# Patient Record
Sex: FEMALE | Race: WHITE | Employment: UNEMPLOYED | URBAN - METROPOLITAN AREA
[De-identification: names, ages, dates, MRNs, and addresses within clinical notes are randomized per-mention and may not be internally consistent; named-entity substitution may affect disease eponyms.]

---

## 2018-01-30 PROBLEM — G44.209 TENSION HEADACHE: Status: ACTIVE | Noted: 2018-01-30

## 2018-01-30 PROBLEM — E03.9 HYPOTHYROIDISM: Status: ACTIVE | Noted: 2018-01-30

## 2018-01-30 PROBLEM — R91.1 PULMONARY NODULE: Status: ACTIVE | Noted: 2018-01-30

## 2018-04-10 ENCOUNTER — HOSPITAL ENCOUNTER (EMERGENCY)
Age: 28
Discharge: HOME | End: 2018-04-10
Attending: EMERGENCY MEDICINE
Payer: OTHER GOVERNMENT

## 2018-04-10 VITALS
TEMPERATURE: 98.2 F | HEIGHT: 70 IN | OXYGEN SATURATION: 100 % | DIASTOLIC BLOOD PRESSURE: 76 MMHG | WEIGHT: 200 LBS | BODY MASS INDEX: 28.63 KG/M2 | HEART RATE: 67 BPM | SYSTOLIC BLOOD PRESSURE: 125 MMHG | RESPIRATION RATE: 18 BRPM

## 2018-04-10 DIAGNOSIS — G43.109 MIGRAINE WITH AURA AND WITHOUT STATUS MIGRAINOSUS, NOT INTRACTABLE: Primary | ICD-10-CM

## 2018-04-10 LAB
CHP ED QC CHECK: YES
PREGNANCY TEST URINE, POC: NEGATIVE

## 2018-04-10 PROCEDURE — 96375 TX/PRO/DX INJ NEW DRUG ADDON: CPT

## 2018-04-10 PROCEDURE — 99284 EMERGENCY DEPT VISIT MOD MDM: CPT

## 2018-04-10 PROCEDURE — 6360000002 HC RX W HCPCS: Performed by: FAMILY MEDICINE

## 2018-04-10 PROCEDURE — 96374 THER/PROPH/DIAG INJ IV PUSH: CPT

## 2018-04-10 RX ORDER — DIPHENHYDRAMINE HYDROCHLORIDE 50 MG/ML
25 INJECTION INTRAMUSCULAR; INTRAVENOUS ONCE
Status: COMPLETED | OUTPATIENT
Start: 2018-04-10 | End: 2018-04-10

## 2018-04-10 RX ORDER — KETOROLAC TROMETHAMINE 30 MG/ML
15 INJECTION, SOLUTION INTRAMUSCULAR; INTRAVENOUS ONCE
Status: DISCONTINUED | OUTPATIENT
Start: 2018-04-10 | End: 2018-04-10

## 2018-04-10 RX ORDER — KETOROLAC TROMETHAMINE 30 MG/ML
15 INJECTION, SOLUTION INTRAMUSCULAR; INTRAVENOUS ONCE
Status: COMPLETED | OUTPATIENT
Start: 2018-04-10 | End: 2018-04-10

## 2018-04-10 RX ADMIN — PROCHLORPERAZINE EDISYLATE 10 MG: 5 INJECTION INTRAMUSCULAR; INTRAVENOUS at 15:54

## 2018-04-10 RX ADMIN — DIPHENHYDRAMINE HYDROCHLORIDE 25 MG: 50 INJECTION, SOLUTION INTRAMUSCULAR; INTRAVENOUS at 15:51

## 2018-04-10 RX ADMIN — KETOROLAC TROMETHAMINE 15 MG: 30 INJECTION, SOLUTION INTRAMUSCULAR at 15:53

## 2018-04-10 ASSESSMENT — ENCOUNTER SYMPTOMS
VOMITING: 0
NAUSEA: 0
SHORTNESS OF BREATH: 0
BACK PAIN: 0
ABDOMINAL DISTENTION: 0
EYE PAIN: 0
EYE DISCHARGE: 0
SORE THROAT: 0
COUGH: 0
WHEEZING: 0
DIARRHEA: 0
EYE REDNESS: 0
SINUS PRESSURE: 0

## 2018-04-10 ASSESSMENT — PAIN DESCRIPTION - PAIN TYPE: TYPE: ACUTE PAIN

## 2018-04-10 ASSESSMENT — PAIN DESCRIPTION - FREQUENCY: FREQUENCY: CONTINUOUS

## 2018-04-10 ASSESSMENT — PAIN DESCRIPTION - ORIENTATION: ORIENTATION: RIGHT;LEFT;POSTERIOR

## 2018-04-10 ASSESSMENT — PAIN SCALES - GENERAL
PAINLEVEL_OUTOF10: 10
PAINLEVEL_OUTOF10: 10

## 2018-04-10 ASSESSMENT — PAIN DESCRIPTION - LOCATION: LOCATION: HEAD

## 2018-04-10 ASSESSMENT — PAIN DESCRIPTION - DESCRIPTORS: DESCRIPTORS: SHARP

## 2018-04-18 ENCOUNTER — TELEPHONE (OUTPATIENT)
Dept: NEUROLOGY | Age: 28
End: 2018-04-18

## 2018-05-20 ENCOUNTER — APPOINTMENT (OUTPATIENT)
Dept: CT IMAGING | Age: 28
End: 2018-05-20
Payer: OTHER GOVERNMENT

## 2018-05-20 ENCOUNTER — HOSPITAL ENCOUNTER (EMERGENCY)
Age: 28
Discharge: HOME | End: 2018-05-20
Attending: EMERGENCY MEDICINE
Payer: OTHER GOVERNMENT

## 2018-05-20 VITALS
DIASTOLIC BLOOD PRESSURE: 64 MMHG | BODY MASS INDEX: 28.92 KG/M2 | WEIGHT: 202 LBS | RESPIRATION RATE: 16 BRPM | HEIGHT: 70 IN | HEART RATE: 67 BPM | OXYGEN SATURATION: 99 % | SYSTOLIC BLOOD PRESSURE: 137 MMHG

## 2018-05-20 DIAGNOSIS — N83.201 CYST OF RIGHT OVARY: Primary | ICD-10-CM

## 2018-05-20 LAB
ALBUMIN SERPL-MCNC: 4.7 G/DL (ref 3.5–5.2)
ALP BLD-CCNC: 97 U/L (ref 35–104)
ALT SERPL-CCNC: 17 U/L (ref 0–32)
ANION GAP SERPL CALCULATED.3IONS-SCNC: 14 MMOL/L (ref 7–16)
AST SERPL-CCNC: 18 U/L (ref 0–31)
BACTERIA: ABNORMAL /HPF
BASOPHILS ABSOLUTE: 0.06 E9/L (ref 0–0.2)
BASOPHILS RELATIVE PERCENT: 0.5 % (ref 0–2)
BILIRUB SERPL-MCNC: 0.3 MG/DL (ref 0–1.2)
BILIRUBIN URINE: NEGATIVE
BLOOD, URINE: ABNORMAL
BUN BLDV-MCNC: 12 MG/DL (ref 6–20)
CALCIUM SERPL-MCNC: 9.4 MG/DL (ref 8.6–10.2)
CHLORIDE BLD-SCNC: 98 MMOL/L (ref 98–107)
CHP ED QC CHECK: NORMAL
CLARITY: ABNORMAL
CO2: 26 MMOL/L (ref 22–29)
COLOR: YELLOW
CREAT SERPL-MCNC: 0.7 MG/DL (ref 0.5–1)
EOSINOPHILS ABSOLUTE: 0.45 E9/L (ref 0.05–0.5)
EOSINOPHILS RELATIVE PERCENT: 3.7 % (ref 0–6)
EPITHELIAL CELLS, UA: ABNORMAL /HPF
GFR AFRICAN AMERICAN: >60
GFR NON-AFRICAN AMERICAN: >60 ML/MIN/1.73
GLUCOSE BLD-MCNC: 82 MG/DL (ref 74–109)
GLUCOSE URINE: NEGATIVE MG/DL
HCT VFR BLD CALC: 41.3 % (ref 34–48)
HEMOGLOBIN: 13.7 G/DL (ref 11.5–15.5)
IMMATURE GRANULOCYTES #: 0.03 E9/L
IMMATURE GRANULOCYTES %: 0.2 % (ref 0–5)
KETONES, URINE: NEGATIVE MG/DL
LEUKOCYTE ESTERASE, URINE: ABNORMAL
LIPASE: 23 U/L (ref 13–60)
LYMPHOCYTES ABSOLUTE: 2.74 E9/L (ref 1.5–4)
LYMPHOCYTES RELATIVE PERCENT: 22.3 % (ref 20–42)
MCH RBC QN AUTO: 28 PG (ref 26–35)
MCHC RBC AUTO-ENTMCNC: 33.2 % (ref 32–34.5)
MCV RBC AUTO: 84.5 FL (ref 80–99.9)
MONOCYTES ABSOLUTE: 0.74 E9/L (ref 0.1–0.95)
MONOCYTES RELATIVE PERCENT: 6 % (ref 2–12)
MUCUS: PRESENT
NEUTROPHILS ABSOLUTE: 8.27 E9/L (ref 1.8–7.3)
NEUTROPHILS RELATIVE PERCENT: 67.3 % (ref 43–80)
NITRITE, URINE: NEGATIVE
PDW BLD-RTO: 13.3 FL (ref 11.5–15)
PH UA: 6 (ref 5–9)
PLATELET # BLD: 300 E9/L (ref 130–450)
PMV BLD AUTO: 9.7 FL (ref 7–12)
POTASSIUM SERPL-SCNC: 3.7 MMOL/L (ref 3.5–5)
PREGNANCY TEST URINE, POC: NEGATIVE
PROTEIN UA: NEGATIVE MG/DL
RBC # BLD: 4.89 E12/L (ref 3.5–5.5)
RBC UA: ABNORMAL /HPF (ref 0–2)
SODIUM BLD-SCNC: 138 MMOL/L (ref 132–146)
SPECIFIC GRAVITY UA: >=1.03 (ref 1–1.03)
TOTAL PROTEIN: 7.4 G/DL (ref 6.4–8.3)
UROBILINOGEN, URINE: 0.2 E.U./DL
WBC # BLD: 12.3 E9/L (ref 4.5–11.5)
WBC UA: ABNORMAL /HPF (ref 0–5)

## 2018-05-20 PROCEDURE — 99284 EMERGENCY DEPT VISIT MOD MDM: CPT

## 2018-05-20 PROCEDURE — 74177 CT ABD & PELVIS W/CONTRAST: CPT

## 2018-05-20 PROCEDURE — 80053 COMPREHEN METABOLIC PANEL: CPT

## 2018-05-20 PROCEDURE — 85025 COMPLETE CBC W/AUTO DIFF WBC: CPT

## 2018-05-20 PROCEDURE — 6360000004 HC RX CONTRAST MEDICATION: Performed by: RADIOLOGY

## 2018-05-20 PROCEDURE — 96375 TX/PRO/DX INJ NEW DRUG ADDON: CPT

## 2018-05-20 PROCEDURE — 81001 URINALYSIS AUTO W/SCOPE: CPT

## 2018-05-20 PROCEDURE — 83690 ASSAY OF LIPASE: CPT

## 2018-05-20 PROCEDURE — 96374 THER/PROPH/DIAG INJ IV PUSH: CPT

## 2018-05-20 PROCEDURE — 2580000003 HC RX 258: Performed by: EMERGENCY MEDICINE

## 2018-05-20 PROCEDURE — 6360000002 HC RX W HCPCS: Performed by: EMERGENCY MEDICINE

## 2018-05-20 RX ORDER — DICLOFENAC SODIUM 75 MG/1
75 TABLET, DELAYED RELEASE ORAL 2 TIMES DAILY
Qty: 20 TABLET | Refills: 0 | Status: SHIPPED | OUTPATIENT
Start: 2018-05-20 | End: 2018-11-13

## 2018-05-20 RX ORDER — KETOROLAC TROMETHAMINE 30 MG/ML
30 INJECTION, SOLUTION INTRAMUSCULAR; INTRAVENOUS ONCE
Status: COMPLETED | OUTPATIENT
Start: 2018-05-20 | End: 2018-05-20

## 2018-05-20 RX ORDER — ONDANSETRON 2 MG/ML
4 INJECTION INTRAMUSCULAR; INTRAVENOUS ONCE
Status: COMPLETED | OUTPATIENT
Start: 2018-05-20 | End: 2018-05-20

## 2018-05-20 RX ORDER — 0.9 % SODIUM CHLORIDE 0.9 %
1000 INTRAVENOUS SOLUTION INTRAVENOUS ONCE
Status: COMPLETED | OUTPATIENT
Start: 2018-05-20 | End: 2018-05-20

## 2018-05-20 RX ORDER — ONDANSETRON 2 MG/ML
4 INJECTION INTRAMUSCULAR; INTRAVENOUS ONCE
Status: DISCONTINUED | OUTPATIENT
Start: 2018-05-20 | End: 2018-05-20 | Stop reason: HOSPADM

## 2018-05-20 RX ORDER — KETOROLAC TROMETHAMINE 30 MG/ML
15 INJECTION, SOLUTION INTRAMUSCULAR; INTRAVENOUS ONCE
Status: DISCONTINUED | OUTPATIENT
Start: 2018-05-20 | End: 2018-05-20 | Stop reason: HOSPADM

## 2018-05-20 RX ADMIN — ONDANSETRON 4 MG: 2 INJECTION INTRAMUSCULAR; INTRAVENOUS at 18:28

## 2018-05-20 RX ADMIN — SODIUM CHLORIDE 1000 ML: 9 INJECTION, SOLUTION INTRAVENOUS at 18:28

## 2018-05-20 RX ADMIN — IOPAMIDOL 110 ML: 755 INJECTION, SOLUTION INTRAVENOUS at 19:08

## 2018-05-20 RX ADMIN — KETOROLAC TROMETHAMINE 30 MG: 30 INJECTION INTRAMUSCULAR; INTRAVENOUS at 18:28

## 2018-05-20 ASSESSMENT — PAIN DESCRIPTION - PAIN TYPE: TYPE: ACUTE PAIN

## 2018-05-20 ASSESSMENT — PAIN DESCRIPTION - ORIENTATION: ORIENTATION: RIGHT

## 2018-05-20 ASSESSMENT — PAIN DESCRIPTION - LOCATION: LOCATION: ABDOMEN

## 2018-05-20 ASSESSMENT — PAIN SCALES - GENERAL
PAINLEVEL_OUTOF10: 4
PAINLEVEL_OUTOF10: 9
PAINLEVEL_OUTOF10: 9

## 2018-05-20 ASSESSMENT — PAIN DESCRIPTION - DESCRIPTORS: DESCRIPTORS: ACHING;SHARP

## 2018-05-25 ENCOUNTER — OFFICE VISIT (OUTPATIENT)
Dept: FAMILY MEDICINE CLINIC | Age: 28
End: 2018-05-25
Payer: OTHER GOVERNMENT

## 2018-05-25 ENCOUNTER — HOSPITAL ENCOUNTER (OUTPATIENT)
Age: 28
Discharge: HOME | End: 2018-05-27
Payer: OTHER GOVERNMENT

## 2018-05-25 VITALS
WEIGHT: 201 LBS | HEART RATE: 62 BPM | DIASTOLIC BLOOD PRESSURE: 78 MMHG | OXYGEN SATURATION: 98 % | SYSTOLIC BLOOD PRESSURE: 112 MMHG | BODY MASS INDEX: 28.84 KG/M2

## 2018-05-25 DIAGNOSIS — E03.9 HYPOTHYROIDISM, UNSPECIFIED TYPE: ICD-10-CM

## 2018-05-25 DIAGNOSIS — E03.9 HYPOTHYROIDISM, UNSPECIFIED TYPE: Primary | ICD-10-CM

## 2018-05-25 DIAGNOSIS — G44.209 TENSION HEADACHE: ICD-10-CM

## 2018-05-25 DIAGNOSIS — N83.209 CYST OF OVARY, UNSPECIFIED LATERALITY: ICD-10-CM

## 2018-05-25 LAB — TSH SERPL DL<=0.05 MIU/L-ACNC: 10.67 UIU/ML (ref 0.27–4.2)

## 2018-05-25 PROCEDURE — 84443 ASSAY THYROID STIM HORMONE: CPT

## 2018-05-25 PROCEDURE — 99214 OFFICE O/P EST MOD 30 MIN: CPT | Performed by: FAMILY MEDICINE

## 2018-05-29 DIAGNOSIS — E03.9 HYPOTHYROIDISM, UNSPECIFIED TYPE: Primary | ICD-10-CM

## 2018-05-29 RX ORDER — LEVOTHYROXINE SODIUM 0.1 MG/1
100 TABLET ORAL DAILY
Qty: 30 TABLET | Refills: 3 | Status: SHIPPED | OUTPATIENT
Start: 2018-05-29 | End: 2018-09-17 | Stop reason: SDUPTHER

## 2018-09-18 ENCOUNTER — OFFICE VISIT (OUTPATIENT)
Dept: FAMILY MEDICINE CLINIC | Age: 28
End: 2018-09-18
Payer: OTHER GOVERNMENT

## 2018-09-18 ENCOUNTER — HOSPITAL ENCOUNTER (OUTPATIENT)
Age: 28
Discharge: HOME | End: 2018-09-20
Payer: OTHER GOVERNMENT

## 2018-09-18 VITALS
TEMPERATURE: 98 F | OXYGEN SATURATION: 97 % | BODY MASS INDEX: 27.35 KG/M2 | WEIGHT: 191 LBS | HEART RATE: 80 BPM | DIASTOLIC BLOOD PRESSURE: 68 MMHG | SYSTOLIC BLOOD PRESSURE: 124 MMHG | RESPIRATION RATE: 20 BRPM | HEIGHT: 70 IN

## 2018-09-18 DIAGNOSIS — R53.83 OTHER FATIGUE: Primary | ICD-10-CM

## 2018-09-18 DIAGNOSIS — E03.9 HYPOTHYROIDISM, UNSPECIFIED TYPE: ICD-10-CM

## 2018-09-18 PROCEDURE — 99213 OFFICE O/P EST LOW 20 MIN: CPT | Performed by: FAMILY MEDICINE

## 2018-09-18 PROCEDURE — 86703 HIV-1/HIV-2 1 RESULT ANTBDY: CPT

## 2018-09-18 PROCEDURE — 84443 ASSAY THYROID STIM HORMONE: CPT

## 2018-09-18 RX ORDER — LEVOTHYROXINE SODIUM 0.1 MG/1
100 TABLET ORAL DAILY
Qty: 30 TABLET | Refills: 3 | Status: SHIPPED | OUTPATIENT
Start: 2018-09-18 | End: 2018-09-19 | Stop reason: SDUPTHER

## 2018-09-18 NOTE — PROGRESS NOTES
FM Progress Note    Subjective: Batsheva Hartman is a 24y.o. year old female here for hypothyroidism. Health Maintenance Due   Topic Date Due    HIV screen  11/30/2007    Cervical cancer screen  11/30/2013    Flu vaccine (1) 09/01/2018       Taking LT4 100 mcg for hypothyroidism. Lab Results   Component Value Date    TSH 10.670 (H) 05/25/2018     Slight constipation. no weight change. no hair changes. no palpitations. No hair thinning. Some fatigue over the past wk. Declined flu shot    Had pap a couple months ago, nl        Past Medical History:   Diagnosis Date    Anxiety     Headache     Hypothyroidism      Past Surgical History:   Procedure Laterality Date    CHOLECYSTECTOMY  2012     Family History   Problem Relation Age of Onset    Arrhythmia Mother         SVT s/p ablation    Cancer Father         skin (2017) & lymphoma (2015)     Social History     Social History    Marital status:      Spouse name: N/A    Number of children: N/A    Years of education: N/A     Occupational History    Not on file.      Social History Main Topics    Smoking status: Former Smoker     Packs/day: 0.25     Years: 1.00     Types: Cigarettes    Smokeless tobacco: Never Used    Alcohol use No    Drug use: No    Sexual activity: Yes     Partners: Male     Birth control/ protection: Pill     Other Topics Concern    Not on file     Social History Narrative    No narrative on file       Review Of Systems (bold are positive, all else are negative)  Gen: No fevers, sweats, chills  No fatigue  No unintentional weight changes  Resp: No cough, shortness of breath, wheeze, chest congestion  CV: No chest pain, palpitation, edema  Endo: No polyuria, polydipsia, polyphagia  No temperature intolerance  Neuro: No headaches  No syncope/near syncope  No dizziness      Objective:  /68   Pulse 80   Temp 98 °F (36.7 °C) (Oral)   Resp 20   Ht 5' 10\" (1.778 m)   Wt 191 lb (86.6 kg)   LMP 07/29/2018 (Exact Date)   SpO2 97%   BMI 27.41 kg/m²   General appearance: NAD, alert and interacting appropriately  HEENT: NCAT, PERRLA, EOMI   Resp: CTAB, no WRC  CVS: RRR, no MRG  Abdomen: BS +, SNDNT  Extremities: No clubbing, cyanosis, or edema. Warm. Dry. I have reviewed this patient's previous records. I have reviewed this patient's TSH    I have reviewed this patient's medications. Assessment/Plan:    Marva Jenkins was seen today for hypothyroidism. Diagnoses and all orders for this visit:    Other fatigue  -     HIV Screen; Future  -     TSH; Future    Hypothyroidism, unspecified type  -     levothyroxine (SYNTHROID) 100 MCG tablet; Take 1 tablet by mouth Daily  -     TSH; Future    Other orders  -     Cancel: INFLUENZA, QUADV, 3 YRS AND OLDER, IM, PF, PREFILL SYR OR SDV, 0.5ML (FLUZONE QUADV, PF)        Patient Instructions   Look up Alberto Wilson for a Vet. rtc pending TSH results.        Electronically signed by Didier Barcenas MD on 9/18/2018 at 3:52 PM

## 2018-09-19 DIAGNOSIS — E03.9 HYPOTHYROIDISM, UNSPECIFIED TYPE: ICD-10-CM

## 2018-09-19 LAB
HIV-1 AND HIV-2 ANTIBODIES: NORMAL
TSH SERPL DL<=0.05 MIU/L-ACNC: 4.54 UIU/ML (ref 0.27–4.2)

## 2018-09-19 RX ORDER — LEVOTHYROXINE SODIUM 112 UG/1
100 TABLET ORAL DAILY
Qty: 30 TABLET | Refills: 2 | Status: SHIPPED | OUTPATIENT
Start: 2018-09-19 | End: 2018-10-09 | Stop reason: SDUPTHER

## 2018-10-04 DIAGNOSIS — B37.9 YEAST INFECTION: Primary | ICD-10-CM

## 2018-10-04 RX ORDER — FLUCONAZOLE 150 MG/1
150 TABLET ORAL ONCE
Qty: 1 TABLET | Refills: 1 | Status: SHIPPED | OUTPATIENT
Start: 2018-10-04 | End: 2018-10-04

## 2018-10-09 DIAGNOSIS — E03.9 HYPOTHYROIDISM, UNSPECIFIED TYPE: ICD-10-CM

## 2018-10-09 RX ORDER — LEVOTHYROXINE SODIUM 0.1 MG/1
100 TABLET ORAL DAILY
Qty: 30 TABLET | Refills: 2 | Status: SHIPPED | OUTPATIENT
Start: 2018-10-09 | End: 2019-08-15 | Stop reason: CLARIF

## 2018-11-08 ENCOUNTER — HOSPITAL ENCOUNTER (OUTPATIENT)
Age: 28
Discharge: HOME | End: 2018-11-08
Payer: OTHER GOVERNMENT

## 2018-11-08 LAB
ABO/RH: NORMAL
ANTIBODY SCREEN: NORMAL
HBA1C MFR BLD: 5.2 % (ref 4–5.6)
HCT VFR BLD CALC: 37.2 % (ref 34–48)
HEMOGLOBIN: 12.9 G/DL (ref 11.5–15.5)
MCH RBC QN AUTO: 29.7 PG (ref 26–35)
MCHC RBC AUTO-ENTMCNC: 34.7 % (ref 32–34.5)
MCV RBC AUTO: 85.5 FL (ref 80–99.9)
PDW BLD-RTO: 13.1 FL (ref 11.5–15)
PLATELET # BLD: 272 E9/L (ref 130–450)
PMV BLD AUTO: 9.7 FL (ref 7–12)
RBC # BLD: 4.35 E12/L (ref 3.5–5.5)
T3 TOTAL: 153.4 NG/DL (ref 80–200)
T4 TOTAL: 10.5 MCG/DL (ref 4.5–11.7)
TSH SERPL DL<=0.05 MIU/L-ACNC: 2.75 UIU/ML (ref 0.27–4.2)
WBC # BLD: 10.2 E9/L (ref 4.5–11.5)

## 2018-11-08 PROCEDURE — 84443 ASSAY THYROID STIM HORMONE: CPT

## 2018-11-08 PROCEDURE — 85027 COMPLETE CBC AUTOMATED: CPT

## 2018-11-08 PROCEDURE — 86901 BLOOD TYPING SEROLOGIC RH(D): CPT

## 2018-11-08 PROCEDURE — 83036 HEMOGLOBIN GLYCOSYLATED A1C: CPT

## 2018-11-08 PROCEDURE — 84436 ASSAY OF TOTAL THYROXINE: CPT

## 2018-11-08 PROCEDURE — 86703 HIV-1/HIV-2 1 RESULT ANTBDY: CPT

## 2018-11-08 PROCEDURE — 86787 VARICELLA-ZOSTER ANTIBODY: CPT

## 2018-11-08 PROCEDURE — 81220 CFTR GENE COM VARIANTS: CPT

## 2018-11-08 PROCEDURE — 86900 BLOOD TYPING SEROLOGIC ABO: CPT

## 2018-11-08 PROCEDURE — 86592 SYPHILIS TEST NON-TREP QUAL: CPT

## 2018-11-08 PROCEDURE — 86803 HEPATITIS C AB TEST: CPT

## 2018-11-08 PROCEDURE — 86762 RUBELLA ANTIBODY: CPT

## 2018-11-08 PROCEDURE — 86850 RBC ANTIBODY SCREEN: CPT

## 2018-11-08 PROCEDURE — 84480 ASSAY TRIIODOTHYRONINE (T3): CPT

## 2018-11-08 PROCEDURE — 87340 HEPATITIS B SURFACE AG IA: CPT

## 2018-11-08 PROCEDURE — 81401 MOPATH PROCEDURE LEVEL 2: CPT

## 2018-11-08 PROCEDURE — 36415 COLL VENOUS BLD VENIPUNCTURE: CPT

## 2018-11-09 LAB
HEPATITIS B SURFACE ANTIGEN INTERPRETATION: NORMAL
HEPATITIS C ANTIBODY INTERPRETATION: NORMAL
HIV-1 AND HIV-2 ANTIBODIES: NORMAL
RPR: NORMAL

## 2018-11-13 ENCOUNTER — APPOINTMENT (OUTPATIENT)
Dept: MRI IMAGING | Age: 28
End: 2018-11-13
Payer: OTHER GOVERNMENT

## 2018-11-13 ENCOUNTER — HOSPITAL ENCOUNTER (EMERGENCY)
Age: 28
Discharge: HOME | End: 2018-11-13
Attending: EMERGENCY MEDICINE
Payer: OTHER GOVERNMENT

## 2018-11-13 ENCOUNTER — APPOINTMENT (OUTPATIENT)
Dept: ULTRASOUND IMAGING | Age: 28
End: 2018-11-13
Payer: OTHER GOVERNMENT

## 2018-11-13 VITALS
DIASTOLIC BLOOD PRESSURE: 66 MMHG | HEIGHT: 70 IN | SYSTOLIC BLOOD PRESSURE: 124 MMHG | OXYGEN SATURATION: 99 % | BODY MASS INDEX: 27.2 KG/M2 | HEART RATE: 64 BPM | WEIGHT: 190 LBS | RESPIRATION RATE: 20 BRPM | TEMPERATURE: 98 F

## 2018-11-13 DIAGNOSIS — R10.31 ABDOMINAL PAIN, RIGHT LOWER QUADRANT: Primary | ICD-10-CM

## 2018-11-13 DIAGNOSIS — O23.40 URINARY TRACT INFECTION IN MOTHER DURING PREGNANCY, ANTEPARTUM: ICD-10-CM

## 2018-11-13 LAB
ALBUMIN SERPL-MCNC: 4.6 G/DL (ref 3.5–5.2)
ALP BLD-CCNC: 82 U/L (ref 35–104)
ALT SERPL-CCNC: 12 U/L (ref 0–32)
ANION GAP SERPL CALCULATED.3IONS-SCNC: 14 MMOL/L (ref 7–16)
AST SERPL-CCNC: 12 U/L (ref 0–31)
BACTERIA: ABNORMAL /HPF
BASOPHILS ABSOLUTE: 0.03 E9/L (ref 0–0.2)
BASOPHILS RELATIVE PERCENT: 0.3 % (ref 0–2)
BILIRUB SERPL-MCNC: 0.4 MG/DL (ref 0–1.2)
BILIRUBIN URINE: NEGATIVE
BLOOD, URINE: ABNORMAL
BUN BLDV-MCNC: 10 MG/DL (ref 6–20)
CALCIUM SERPL-MCNC: 9.5 MG/DL (ref 8.6–10.2)
CHLORIDE BLD-SCNC: 100 MMOL/L (ref 98–107)
CHP ED QC CHECK: NORMAL
CLARITY: CLEAR
CO2: 24 MMOL/L (ref 22–29)
COLOR: YELLOW
CREAT SERPL-MCNC: 0.6 MG/DL (ref 0.5–1)
EOSINOPHILS ABSOLUTE: 0.19 E9/L (ref 0.05–0.5)
EOSINOPHILS RELATIVE PERCENT: 1.9 % (ref 0–6)
EPITHELIAL CELLS, UA: ABNORMAL /HPF
GFR AFRICAN AMERICAN: >60
GFR NON-AFRICAN AMERICAN: >60 ML/MIN/1.73
GLUCOSE BLD-MCNC: 80 MG/DL (ref 74–99)
GLUCOSE URINE: NEGATIVE MG/DL
HCT VFR BLD CALC: 38.5 % (ref 34–48)
HEMOGLOBIN: 13.3 G/DL (ref 11.5–15.5)
IMMATURE GRANULOCYTES #: 0.05 E9/L
IMMATURE GRANULOCYTES %: 0.5 % (ref 0–5)
KETONES, URINE: NEGATIVE MG/DL
LACTIC ACID, SEPSIS: 0.7 MMOL/L (ref 0.5–1.9)
LEUKOCYTE ESTERASE, URINE: ABNORMAL
LIPASE: 23 U/L (ref 13–60)
LYMPHOCYTES ABSOLUTE: 2.36 E9/L (ref 1.5–4)
LYMPHOCYTES RELATIVE PERCENT: 23.8 % (ref 20–42)
MCH RBC QN AUTO: 29.6 PG (ref 26–35)
MCHC RBC AUTO-ENTMCNC: 34.5 % (ref 32–34.5)
MCV RBC AUTO: 85.7 FL (ref 80–99.9)
MONOCYTES ABSOLUTE: 0.5 E9/L (ref 0.1–0.95)
MONOCYTES RELATIVE PERCENT: 5 % (ref 2–12)
NEUTROPHILS ABSOLUTE: 6.78 E9/L (ref 1.8–7.3)
NEUTROPHILS RELATIVE PERCENT: 68.5 % (ref 43–80)
NITRITE, URINE: NEGATIVE
PDW BLD-RTO: 12.9 FL (ref 11.5–15)
PH UA: 6.5 (ref 5–9)
PLATELET # BLD: 264 E9/L (ref 130–450)
PMV BLD AUTO: 9.6 FL (ref 7–12)
POTASSIUM SERPL-SCNC: 3.6 MMOL/L (ref 3.5–5)
PREGNANCY TEST URINE, POC: POSITIVE
PROTEIN UA: NEGATIVE MG/DL
RBC # BLD: 4.49 E12/L (ref 3.5–5.5)
RBC UA: ABNORMAL /HPF (ref 0–2)
RUBELLA ANTIBODY IGG: NORMAL
SODIUM BLD-SCNC: 138 MMOL/L (ref 132–146)
SPECIFIC GRAVITY UA: 1.01 (ref 1–1.03)
TOTAL PROTEIN: 7.6 G/DL (ref 6.4–8.3)
UROBILINOGEN, URINE: 0.2 E.U./DL
VARICELLA-ZOSTER VIRUS AB, IGG: NORMAL
WBC # BLD: 9.9 E9/L (ref 4.5–11.5)
WBC UA: ABNORMAL /HPF (ref 0–5)

## 2018-11-13 PROCEDURE — 81001 URINALYSIS AUTO W/SCOPE: CPT

## 2018-11-13 PROCEDURE — 74181 MRI ABDOMEN W/O CONTRAST: CPT

## 2018-11-13 PROCEDURE — 76705 ECHO EXAM OF ABDOMEN: CPT

## 2018-11-13 PROCEDURE — 83690 ASSAY OF LIPASE: CPT

## 2018-11-13 PROCEDURE — 87088 URINE BACTERIA CULTURE: CPT

## 2018-11-13 PROCEDURE — 99284 EMERGENCY DEPT VISIT MOD MDM: CPT

## 2018-11-13 PROCEDURE — 83605 ASSAY OF LACTIC ACID: CPT

## 2018-11-13 PROCEDURE — 85025 COMPLETE CBC W/AUTO DIFF WBC: CPT

## 2018-11-13 PROCEDURE — 2580000003 HC RX 258: Performed by: EMERGENCY MEDICINE

## 2018-11-13 PROCEDURE — 80053 COMPREHEN METABOLIC PANEL: CPT

## 2018-11-13 RX ORDER — CEPHALEXIN 500 MG/1
500 CAPSULE ORAL 2 TIMES DAILY
Qty: 14 CAPSULE | Refills: 0 | Status: SHIPPED | OUTPATIENT
Start: 2018-11-13 | End: 2018-11-20

## 2018-11-13 RX ORDER — METOCLOPRAMIDE 10 MG/1
10 TABLET ORAL 3 TIMES DAILY PRN
Qty: 30 TABLET | Refills: 1 | Status: ON HOLD | OUTPATIENT
Start: 2018-11-13 | End: 2019-05-10

## 2018-11-13 RX ORDER — 0.9 % SODIUM CHLORIDE 0.9 %
1000 INTRAVENOUS SOLUTION INTRAVENOUS ONCE
Status: COMPLETED | OUTPATIENT
Start: 2018-11-13 | End: 2018-11-13

## 2018-11-13 RX ADMIN — SODIUM CHLORIDE 1000 ML: 9 INJECTION, SOLUTION INTRAVENOUS at 08:05

## 2018-11-13 ASSESSMENT — PAIN DESCRIPTION - PAIN TYPE: TYPE: ACUTE PAIN

## 2018-11-13 ASSESSMENT — PAIN SCALES - GENERAL: PAINLEVEL_OUTOF10: 7

## 2018-11-13 ASSESSMENT — PAIN DESCRIPTION - LOCATION: LOCATION: ABDOMEN

## 2018-11-13 NOTE — ED NOTES
Discharge instructions reviewed , including diagnosis, medications, follow up appointments, home care, and also when to call 911. All discharge instructions questions answered.          Pt left ED ambulatory         Fanta Arnold RN  11/13/18 5823

## 2018-11-13 NOTE — ED PROVIDER NOTES
appearing, non toxic in NAD  Head: Normocephalic and atraumatic  Eyes: PERRL, EOMI, conjunctiva normal, sclera non icteric  Mouth: Oropharynx clear  Neck: Supple  Respiratory: Lungs clear to auscultation bilaterally, no wheezes, rales, or rhonchi. Not in respiratory distress  Cardiovascular:  Regular rate. Regular rhythm. No murmurs, gallops, or rubs. 2+ distal pulses  Chest: No chest wall tenderness  GI:  Abdomen Soft, RLQ tenderness, Non distended. +BS. No rebound, guarding, or rigidity. No pulsatile masses. Musculoskeletal: Moves all extremities x 4. Warm and well perfused, no clubbing, cyanosis, or edema. Right lower back tenderness. Integument: skin warm and dry. No rashes. Neurologic: GCS 15, no focal deficits, symmetric strength 5/5 in the upper and lower extremities bilaterally  Psychiatric: Normal Affect    -------------------------------------------------- RESULTS -------------------------------------------------  I have personally reviewed all laboratory and imaging results for this patient. Results are listed below.      LABS:  Results for orders placed or performed during the hospital encounter of 11/13/18   Comprehensive Metabolic Panel   Result Value Ref Range    Sodium 138 132 - 146 mmol/L    Potassium 3.6 3.5 - 5.0 mmol/L    Chloride 100 98 - 107 mmol/L    CO2 24 22 - 29 mmol/L    Anion Gap 14 7 - 16 mmol/L    Glucose 80 74 - 99 mg/dL    BUN 10 6 - 20 mg/dL    CREATININE 0.6 0.5 - 1.0 mg/dL    GFR Non-African American >60 >=60 mL/min/1.73    GFR African American >60     Calcium 9.5 8.6 - 10.2 mg/dL    Total Protein 7.6 6.4 - 8.3 g/dL    Alb 4.6 3.5 - 5.2 g/dL    Total Bilirubin 0.4 0.0 - 1.2 mg/dL    Alkaline Phosphatase 82 35 - 104 U/L    ALT 12 0 - 32 U/L    AST 12 0 - 31 U/L   CBC Auto Differential   Result Value Ref Range    WBC 9.9 4.5 - 11.5 E9/L    RBC 4.49 3.50 - 5.50 E12/L    Hemoglobin 13.3 11.5 - 15.5 g/dL    Hematocrit 38.5 34.0 - 48.0 %    MCV 85.7 80.0 - 99.9 fL    MCH 29.6 26.0

## 2018-11-14 LAB
CYSTIC FIBROSIS 165 VARIANTS INTERP: NORMAL
CYSTIC FIBROSIS 5T VARIANT: NORMAL
CYSTIC FIBROSIS ALLELE 1: NEGATIVE
CYSTIC FIBROSIS ALLELE 2: NEGATIVE
URINE CULTURE, ROUTINE: NORMAL

## 2018-11-26 LAB
INTERPRETATION: NORMAL
SMA COPY NUMBER, LINKED VARIANT: NORMAL
SMA COPY NUMBER, SMN1 COPIES: NORMAL
SMA COPY NUMBER, SMN2 COPIES: NORMAL
SMA COPY NUMBER, SPECIMEN: NORMAL
SMA COPY NUMBER, SYMPTOMS: NORMAL

## 2018-12-04 ENCOUNTER — TELEPHONE (OUTPATIENT)
Dept: FAMILY MEDICINE CLINIC | Age: 28
End: 2018-12-04

## 2018-12-04 NOTE — TELEPHONE ENCOUNTER
Letter sent to patient to reschedule appointment that was on 12/18/18 due to provider unavailable. Left 3 voice messages for patient to contact office .

## 2019-01-13 ENCOUNTER — HOSPITAL ENCOUNTER (OUTPATIENT)
Age: 29
Discharge: HOME | End: 2019-01-14
Attending: SPECIALIST | Admitting: SPECIALIST
Payer: OTHER GOVERNMENT

## 2019-01-13 VITALS
TEMPERATURE: 99.5 F | RESPIRATION RATE: 16 BRPM | HEART RATE: 82 BPM | SYSTOLIC BLOOD PRESSURE: 122 MMHG | DIASTOLIC BLOOD PRESSURE: 60 MMHG

## 2019-01-13 PROBLEM — O26.892 PELVIC PRESSURE IN PREGNANCY, SECOND TRIMESTER: Status: ACTIVE | Noted: 2019-01-13

## 2019-01-13 PROBLEM — R10.2 PELVIC PRESSURE IN PREGNANCY, SECOND TRIMESTER: Status: ACTIVE | Noted: 2019-01-13

## 2019-01-13 LAB
BILIRUBIN URINE: NEGATIVE
BLOOD, URINE: ABNORMAL
CLARITY: CLEAR
COLOR: YELLOW
GLUCOSE URINE: NEGATIVE MG/DL
KETONES, URINE: NEGATIVE MG/DL
LEUKOCYTE ESTERASE, URINE: ABNORMAL
NITRITE, URINE: NEGATIVE
PH UA: 6 (ref 5–9)
PROTEIN UA: NEGATIVE MG/DL
SPECIFIC GRAVITY UA: 1.02 (ref 1–1.03)
UROBILINOGEN, URINE: 0.2 E.U./DL

## 2019-01-13 PROCEDURE — 99203 OFFICE O/P NEW LOW 30 MIN: CPT | Performed by: NURSE PRACTITIONER

## 2019-01-13 PROCEDURE — 81001 URINALYSIS AUTO W/SCOPE: CPT

## 2019-01-14 LAB
BACTERIA: ABNORMAL /HPF
CRYSTALS, UA: ABNORMAL
EPITHELIAL CELLS, UA: ABNORMAL /HPF
RBC UA: ABNORMAL /HPF (ref 0–2)
WBC UA: ABNORMAL /HPF (ref 0–5)

## 2019-01-14 PROCEDURE — 99211 OFF/OP EST MAY X REQ PHY/QHP: CPT

## 2019-01-14 PROCEDURE — 99201 HC NEW PT, OUTPT VISIT LEVEL 1: CPT

## 2019-01-22 ENCOUNTER — HOSPITAL ENCOUNTER (OUTPATIENT)
Age: 29
Discharge: HOME | End: 2019-01-22
Payer: OTHER GOVERNMENT

## 2019-01-22 LAB
T4 FREE: 1.01 NG/DL (ref 0.93–1.7)
TSH SERPL DL<=0.05 MIU/L-ACNC: 2.79 UIU/ML (ref 0.27–4.2)

## 2019-01-22 PROCEDURE — 36415 COLL VENOUS BLD VENIPUNCTURE: CPT

## 2019-01-22 PROCEDURE — 84443 ASSAY THYROID STIM HORMONE: CPT

## 2019-01-22 PROCEDURE — 84439 ASSAY OF FREE THYROXINE: CPT

## 2019-02-19 ENCOUNTER — HOSPITAL ENCOUNTER (OUTPATIENT)
Age: 29
Discharge: HOME | End: 2019-02-19
Payer: OTHER GOVERNMENT

## 2019-02-19 LAB
T3 FREE: 2.9 PG/ML (ref 2–4.4)
T4 FREE: 0.91 NG/DL (ref 0.93–1.7)
TSH SERPL DL<=0.05 MIU/L-ACNC: 4.49 UIU/ML (ref 0.27–4.2)

## 2019-02-19 PROCEDURE — 84443 ASSAY THYROID STIM HORMONE: CPT

## 2019-02-19 PROCEDURE — 36415 COLL VENOUS BLD VENIPUNCTURE: CPT

## 2019-02-19 PROCEDURE — 84439 ASSAY OF FREE THYROXINE: CPT

## 2019-02-19 PROCEDURE — 84481 FREE ASSAY (FT-3): CPT

## 2019-03-04 ENCOUNTER — HOSPITAL ENCOUNTER (OUTPATIENT)
Age: 29
Discharge: HOME | End: 2019-03-04
Payer: OTHER GOVERNMENT

## 2019-03-04 LAB
ABO/RH: NORMAL
ANTIBODY SCREEN: NORMAL

## 2019-03-04 PROCEDURE — 86901 BLOOD TYPING SEROLOGIC RH(D): CPT

## 2019-03-04 PROCEDURE — 86900 BLOOD TYPING SEROLOGIC ABO: CPT

## 2019-03-04 PROCEDURE — 86850 RBC ANTIBODY SCREEN: CPT

## 2019-03-04 PROCEDURE — 36415 COLL VENOUS BLD VENIPUNCTURE: CPT

## 2019-03-06 ENCOUNTER — HOSPITAL ENCOUNTER (OUTPATIENT)
Dept: INFUSION THERAPY | Age: 29
Setting detail: INFUSION SERIES
Discharge: HOME | End: 2019-03-06
Payer: OTHER GOVERNMENT

## 2019-03-06 VITALS
TEMPERATURE: 98.1 F | HEIGHT: 70 IN | HEART RATE: 109 BPM | SYSTOLIC BLOOD PRESSURE: 114 MMHG | DIASTOLIC BLOOD PRESSURE: 59 MMHG | BODY MASS INDEX: 29.35 KG/M2 | RESPIRATION RATE: 16 BRPM | OXYGEN SATURATION: 99 % | WEIGHT: 205 LBS

## 2019-03-06 LAB — RHIG LOT NUMBER: NORMAL

## 2019-03-06 PROCEDURE — 6360000002 HC RX W HCPCS: Performed by: PHYSICIAN ASSISTANT

## 2019-03-06 PROCEDURE — 96372 THER/PROPH/DIAG INJ SC/IM: CPT

## 2019-03-06 RX ADMIN — HUMAN RHO(D) IMMUNE GLOBULIN 300 MCG: 300 INJECTION, SOLUTION INTRAMUSCULAR at 10:28

## 2019-03-20 ENCOUNTER — HOSPITAL ENCOUNTER (OUTPATIENT)
Age: 29
Setting detail: OBSERVATION
Discharge: HOME | End: 2019-03-20
Attending: SPECIALIST | Admitting: SPECIALIST
Payer: OTHER GOVERNMENT

## 2019-03-20 VITALS
TEMPERATURE: 98.2 F | WEIGHT: 212 LBS | DIASTOLIC BLOOD PRESSURE: 59 MMHG | BODY MASS INDEX: 30.35 KG/M2 | HEIGHT: 70 IN | RESPIRATION RATE: 16 BRPM | SYSTOLIC BLOOD PRESSURE: 128 MMHG | HEART RATE: 88 BPM

## 2019-03-20 PROBLEM — Z3A.30 30 WEEKS GESTATION OF PREGNANCY: Status: ACTIVE | Noted: 2019-03-20

## 2019-03-20 PROBLEM — R10.9 ABDOMINAL CRAMPING AFFECTING PREGNANCY, ANTEPARTUM: Status: ACTIVE | Noted: 2019-03-20

## 2019-03-20 PROBLEM — O26.899 ABDOMINAL CRAMPING AFFECTING PREGNANCY, ANTEPARTUM: Status: ACTIVE | Noted: 2019-03-20

## 2019-03-20 PROBLEM — N83.209 OVARIAN CYST: Status: RESOLVED | Noted: 2018-05-25 | Resolved: 2019-03-20

## 2019-03-20 PROBLEM — R91.1 PULMONARY NODULE: Status: RESOLVED | Noted: 2018-01-30 | Resolved: 2019-03-20

## 2019-03-20 PROBLEM — G44.209 TENSION HEADACHE: Status: RESOLVED | Noted: 2018-01-30 | Resolved: 2019-03-20

## 2019-03-20 LAB
BILIRUBIN URINE: NEGATIVE
BLOOD, URINE: NEGATIVE
CLARITY: CLEAR
COLOR: YELLOW
GLUCOSE URINE: NEGATIVE MG/DL
KETONES, URINE: NEGATIVE MG/DL
LEUKOCYTE ESTERASE, URINE: NEGATIVE
NITRITE, URINE: NEGATIVE
PH UA: 6 (ref 5–9)
PROTEIN UA: NEGATIVE MG/DL
SPECIFIC GRAVITY UA: >=1.03 (ref 1–1.03)
UROBILINOGEN, URINE: 0.2 E.U./DL

## 2019-03-20 PROCEDURE — G0378 HOSPITAL OBSERVATION PER HR: HCPCS

## 2019-03-20 PROCEDURE — 81003 URINALYSIS AUTO W/O SCOPE: CPT

## 2019-04-16 ENCOUNTER — HOSPITAL ENCOUNTER (OUTPATIENT)
Age: 29
Discharge: HOME | End: 2019-04-16
Payer: OTHER GOVERNMENT

## 2019-04-16 LAB
T4 FREE: 1 NG/DL (ref 0.93–1.7)
TSH SERPL DL<=0.05 MIU/L-ACNC: 2.44 UIU/ML (ref 0.27–4.2)

## 2019-04-16 PROCEDURE — 84443 ASSAY THYROID STIM HORMONE: CPT

## 2019-04-16 PROCEDURE — 36415 COLL VENOUS BLD VENIPUNCTURE: CPT

## 2019-04-16 PROCEDURE — 84439 ASSAY OF FREE THYROXINE: CPT

## 2019-04-29 ENCOUNTER — HOSPITAL ENCOUNTER (OUTPATIENT)
Age: 29
Discharge: HOME | End: 2019-04-29
Attending: SPECIALIST | Admitting: SPECIALIST
Payer: OTHER GOVERNMENT

## 2019-04-29 VITALS
HEART RATE: 100 BPM | SYSTOLIC BLOOD PRESSURE: 132 MMHG | TEMPERATURE: 99.1 F | DIASTOLIC BLOOD PRESSURE: 68 MMHG | RESPIRATION RATE: 16 BRPM

## 2019-04-29 PROBLEM — Z3A.36 36 WEEKS GESTATION OF PREGNANCY: Status: ACTIVE | Noted: 2019-04-29

## 2019-04-29 LAB
BACTERIA: NORMAL /HPF
BILIRUBIN URINE: NEGATIVE
BLOOD, URINE: NEGATIVE
CLARITY: CLEAR
COLOR: YELLOW
EPITHELIAL CELLS, UA: NORMAL /HPF
GLUCOSE URINE: NEGATIVE MG/DL
KETONES, URINE: ABNORMAL MG/DL
LEUKOCYTE ESTERASE, URINE: NEGATIVE
NITRITE, URINE: NEGATIVE
PH UA: 6 (ref 5–9)
PROTEIN UA: ABNORMAL MG/DL
RBC UA: NORMAL /HPF (ref 0–2)
SPECIFIC GRAVITY UA: 1.02 (ref 1–1.03)
UROBILINOGEN, URINE: 1 E.U./DL
WBC UA: NORMAL /HPF (ref 0–5)

## 2019-04-29 PROCEDURE — 99213 OFFICE O/P EST LOW 20 MIN: CPT | Performed by: MIDWIFE

## 2019-04-29 PROCEDURE — 99212 OFFICE O/P EST SF 10 MIN: CPT

## 2019-04-29 PROCEDURE — 81001 URINALYSIS AUTO W/SCOPE: CPT

## 2019-04-29 NOTE — H&P
Department of Obstetrics and Gynecology  Nurse Practitioner Obstetrics History and Physical        CHIEF COMPLAINT:  Contractions and decreased fetal movement    HISTORY OF PRESENT ILLNESS:    The patient is a 34 y.o. female [id number], Patient's last menstrual period was 2018 (exact date). ,  at 36w0d. Patient presents with c/o contractions that started around 3pm and were 5-10 minutes apart. After contractions started, she noticed decreased fetal movement. Denies bleeding or LOF. States she was checked in office today and cervix was 2-3 cm. Pregnancy uncomplicated, history of 2 previous vaginal births  OB History        4    Para   2    Term   2       0    AB   1    Living   2       SAB   1    TAB        Ectopic        Molar        Multiple        Live Births                    Estimated Due Date: Estimated Date of Delivery: 19      Pregnancy complicated by:   Patient Active Problem List   Diagnosis Code    Hypothyroidism E03.9    Pelvic pressure in pregnancy, second trimester O26.892, N94.9    30 weeks gestation of pregnancy Z3A.30    Abdominal cramping affecting pregnancy, antepartum O26.899, R10.9         PAST OB HISTORY  OB History        4    Para   2    Term   2       0    AB   1    Living   2       SAB   1    TAB        Ectopic        Molar        Multiple        Live Births                    Past Medical History:        Diagnosis Date    Anxiety     Headache     Hypothyroidism      Past Surgical History:        Procedure Laterality Date    CHOLECYSTECTOMY       Social History:    TOBACCO:   reports that she quit smoking about 5 months ago. Her smoking use included cigarettes. She has a 0.25 pack-year smoking history. She has never used smokeless tobacco.  ETOH:   reports that she does not drink alcohol. DRUGS:   reports that she does not use drugs.   Family History:       Problem Relation Age of Onset    Arrhythmia Mother         SVT s/p ablation    Cancer Father         skin (2017) & lymphoma (2015)     Medications Prior to Admission:  Medications Prior to Admission: Prenatal MV-Min-Fe Fum-FA-DHA (PRENATAL 1 PO), Take by mouth daily  metoclopramide (REGLAN) 10 MG tablet, Take 1 tablet by mouth 3 times daily as needed (nausea, vomiting)  levothyroxine (SYNTHROID) 100 MCG tablet, Take 1 tablet by mouth Daily Take 112 mcg MWF, take 100 mcg all other days (Patient taking differently: Take 112 mcg by mouth Daily )  Allergies:  Patient has no known allergies. Review of Systems:   Ears, nose, mouth, throat, and face: negative  Respiratory: negative  Cardiovascular: negative  Gastrointestinal: negative  Genitourinary:denies dysuria, urgency or frequency  Integument/breast: negative  Hematologic/lymphatic: negative  Musculoskeletal:negative  Neurological: negative  Behavioral/Psych: negative  Endocrine: negative  Allergic/Immunologic: negative  Psychosocial: negative    PHYSICAL EXAM:    /68   Pulse 100   Temp 99.1 °F (37.3 °C) (Oral)   Resp 16   LMP 2018 (Exact Date)     General appearance:  awake, alert, cooperative, no apparent distress, and appears stated age  Neurologic:  Awake, alert, oriented to name, place and time.   Ambulatory to unit        Abdomen:   soft, gravid, non-tender, vertex fetus, EFW AGA  Fetal heart rate:  Baseline Heart Rate 135 with mod variability, accelerations:  present, decels:absent  Pelvis:  External Genitalia: no lesions  Cervix:  DILATION:  2-3 cm  EFFACEMENT:   50%  STATION:  -3  POSITION:  post  CONSISTENCY:  firm    Contraction frequency:  Irregular, 3 contractions in 40 minutes  Membranes:  intact      GBS  unknown     Impression:  34year old  at 36 weeks gestation, contractions and decreased fetal movement, reactive NST    D/w Dr. Mills Liner:  Observe, monitor, straight cath UA, if urine negative and cervix unchanged, may discharge to home        Electronically signed by EDGAR Angel CNM on 4/29/2019 at 7:35 PM

## 2019-04-29 NOTE — PROGRESS NOTES
Patient arrived with complaints of contractions that are 5-10 minutes apart that began at 3 pm today. Patient states that her cervix is 3 cm per vag exam in the doctor's office today. Denies loss of fluid and denies vaginal bleeding.  4 Para 2. [facility] 2019. Denies allergies to medications. Placed on fetal monitor with explanation.

## 2019-04-30 NOTE — PROGRESS NOTES
Dr Dontae Frazier updated patient VE unchanged and trace ketomes in UA. [facility] for discharge.

## 2019-05-05 ENCOUNTER — HOSPITAL ENCOUNTER (INPATIENT)
Age: 29
LOS: 1 days | Discharge: HOME | DRG: 833 | End: 2019-05-06
Attending: SPECIALIST | Admitting: OBSTETRICS & GYNECOLOGY
Payer: OTHER GOVERNMENT

## 2019-05-05 PROBLEM — Z34.93 PREGNANT AND NOT YET DELIVERED IN THIRD TRIMESTER: Status: ACTIVE | Noted: 2019-05-05

## 2019-05-05 LAB
BACTERIA: NORMAL /HPF
BILIRUBIN URINE: NEGATIVE
BLOOD, URINE: NEGATIVE
CLARITY: CLEAR
COLOR: YELLOW
CRYSTALS, UA: NORMAL
EPITHELIAL CELLS, UA: NORMAL /HPF
GLUCOSE URINE: NEGATIVE MG/DL
KETONES, URINE: NEGATIVE MG/DL
LEUKOCYTE ESTERASE, URINE: ABNORMAL
NITRITE, URINE: NEGATIVE
PH UA: 6 (ref 5–9)
PROTEIN UA: NEGATIVE MG/DL
RBC UA: NORMAL /HPF (ref 0–2)
SPECIFIC GRAVITY UA: 1.02 (ref 1–1.03)
UROBILINOGEN, URINE: 0.2 E.U./DL
WBC UA: NORMAL /HPF (ref 0–5)

## 2019-05-05 PROCEDURE — 81001 URINALYSIS AUTO W/SCOPE: CPT

## 2019-05-05 PROCEDURE — 2580000003 HC RX 258: Performed by: OBSTETRICS & GYNECOLOGY

## 2019-05-05 PROCEDURE — 96361 HYDRATE IV INFUSION ADD-ON: CPT

## 2019-05-05 PROCEDURE — G0378 HOSPITAL OBSERVATION PER HR: HCPCS

## 2019-05-05 RX ORDER — SODIUM CHLORIDE, SODIUM LACTATE, POTASSIUM CHLORIDE, CALCIUM CHLORIDE 600; 310; 30; 20 MG/100ML; MG/100ML; MG/100ML; MG/100ML
INJECTION, SOLUTION INTRAVENOUS CONTINUOUS
Status: DISCONTINUED | OUTPATIENT
Start: 2019-05-05 | End: 2019-05-06 | Stop reason: HOSPADM

## 2019-05-05 RX ADMIN — SODIUM CHLORIDE, POTASSIUM CHLORIDE, SODIUM LACTATE AND CALCIUM CHLORIDE: 600; 310; 30; 20 INJECTION, SOLUTION INTRAVENOUS at 20:55

## 2019-05-05 NOTE — PROGRESS NOTES
Patient presents to unit with c/o ctxs and pressure around 1400. States she may be leaking fluid. Denies vb. States positive fm. Placed on efm. States she has hypothyroidism and has hx of placenta previa with 2nd pregnancy. States she was 3 cm in office Monday.

## 2019-05-06 ENCOUNTER — ANESTHESIA (OUTPATIENT)
Dept: LABOR AND DELIVERY | Age: 29
End: 2019-05-06

## 2019-05-06 ENCOUNTER — ANESTHESIA EVENT (OUTPATIENT)
Dept: LABOR AND DELIVERY | Age: 29
End: 2019-05-06

## 2019-05-06 VITALS
WEIGHT: 218 LBS | BODY MASS INDEX: 31.21 KG/M2 | RESPIRATION RATE: 19 BRPM | SYSTOLIC BLOOD PRESSURE: 123 MMHG | HEIGHT: 70 IN | HEART RATE: 77 BPM | TEMPERATURE: 98.3 F | DIASTOLIC BLOOD PRESSURE: 71 MMHG

## 2019-05-06 PROBLEM — Z3A.37 37 WEEKS GESTATION OF PREGNANCY: Status: ACTIVE | Noted: 2019-05-06

## 2019-05-06 LAB
ABO/RH: NORMAL
ANTIBODY IDENTIFICATION: NORMAL
ANTIBODY SCREEN: NORMAL
DAT POLYSPECIFIC: NORMAL
HCT VFR BLD CALC: 31.9 % (ref 34–48)
HEMOGLOBIN: 10.7 G/DL (ref 11.5–15.5)
MCH RBC QN AUTO: 28.8 PG (ref 26–35)
MCHC RBC AUTO-ENTMCNC: 33.5 % (ref 32–34.5)
MCV RBC AUTO: 85.8 FL (ref 80–99.9)
PDW BLD-RTO: 13.2 FL (ref 11.5–15)
PLATELET # BLD: 330 E9/L (ref 130–450)
PMV BLD AUTO: 9.6 FL (ref 7–12)
RBC # BLD: 3.72 E12/L (ref 3.5–5.5)
WBC # BLD: 15.2 E9/L (ref 4.5–11.5)

## 2019-05-06 PROCEDURE — 86850 RBC ANTIBODY SCREEN: CPT

## 2019-05-06 PROCEDURE — 96374 THER/PROPH/DIAG INJ IV PUSH: CPT

## 2019-05-06 PROCEDURE — 85027 COMPLETE CBC AUTOMATED: CPT

## 2019-05-06 PROCEDURE — 36415 COLL VENOUS BLD VENIPUNCTURE: CPT

## 2019-05-06 PROCEDURE — 2580000003 HC RX 258: Performed by: OBSTETRICS & GYNECOLOGY

## 2019-05-06 PROCEDURE — 1220000001 HC SEMI PRIVATE L&D R&B

## 2019-05-06 PROCEDURE — 96376 TX/PRO/DX INJ SAME DRUG ADON: CPT

## 2019-05-06 PROCEDURE — G0378 HOSPITAL OBSERVATION PER HR: HCPCS

## 2019-05-06 PROCEDURE — 86901 BLOOD TYPING SEROLOGIC RH(D): CPT

## 2019-05-06 PROCEDURE — 86900 BLOOD TYPING SEROLOGIC ABO: CPT

## 2019-05-06 PROCEDURE — 86870 RBC ANTIBODY IDENTIFICATION: CPT

## 2019-05-06 PROCEDURE — 86880 COOMBS TEST DIRECT: CPT

## 2019-05-06 PROCEDURE — 6360000002 HC RX W HCPCS: Performed by: OBSTETRICS & GYNECOLOGY

## 2019-05-06 PROCEDURE — 96361 HYDRATE IV INFUSION ADD-ON: CPT

## 2019-05-06 RX ORDER — ONDANSETRON 2 MG/ML
4 INJECTION INTRAMUSCULAR; INTRAVENOUS EVERY 6 HOURS PRN
Status: DISCONTINUED | OUTPATIENT
Start: 2019-05-06 | End: 2019-05-06 | Stop reason: HOSPADM

## 2019-05-06 RX ORDER — SODIUM CHLORIDE, SODIUM LACTATE, POTASSIUM CHLORIDE, CALCIUM CHLORIDE 600; 310; 30; 20 MG/100ML; MG/100ML; MG/100ML; MG/100ML
INJECTION, SOLUTION INTRAVENOUS CONTINUOUS
Status: DISCONTINUED | OUTPATIENT
Start: 2019-05-06 | End: 2019-05-06 | Stop reason: HOSPADM

## 2019-05-06 RX ADMIN — BUTORPHANOL TARTRATE 2 MG: 2 INJECTION, SOLUTION INTRAMUSCULAR; INTRAVENOUS at 01:23

## 2019-05-06 RX ADMIN — SODIUM CHLORIDE, POTASSIUM CHLORIDE, SODIUM LACTATE AND CALCIUM CHLORIDE: 600; 310; 30; 20 INJECTION, SOLUTION INTRAVENOUS at 01:30

## 2019-05-06 RX ADMIN — SODIUM CHLORIDE, POTASSIUM CHLORIDE, SODIUM LACTATE AND CALCIUM CHLORIDE: 600; 310; 30; 20 INJECTION, SOLUTION INTRAVENOUS at 09:24

## 2019-05-06 RX ADMIN — BUTORPHANOL TARTRATE 2 MG: 2 INJECTION, SOLUTION INTRAMUSCULAR; INTRAVENOUS at 04:32

## 2019-05-06 RX ADMIN — BUTORPHANOL TARTRATE 2 MG: 2 INJECTION, SOLUTION INTRAMUSCULAR; INTRAVENOUS at 07:44

## 2019-05-06 ASSESSMENT — PAIN SCALES - GENERAL
PAINLEVEL_OUTOF10: 7
PAINLEVEL_OUTOF10: 8
PAINLEVEL_OUTOF10: 7

## 2019-05-06 NOTE — PROGRESS NOTES
Dr. Tevin Fatima on unit: notified patient requesting to ambulate in halls to see if will cause cervical change. Order received: patient may intermittently ambulate if FHT reassuring (off monitor for 20 minutes/on for 20 minutes).

## 2019-05-06 NOTE — PROGRESS NOTES
Dr. Johnathan Roper called in, updated on FHTs, ctx pattern and cervical exam, states will update Dr. Jolie Robles.

## 2019-05-06 NOTE — PROGRESS NOTES
Called Alberto Bray for [employer]. [facility] will fax the GBS. Waiting for a call back from [facility] for other prenatals.

## 2019-05-06 NOTE — H&P
Department of Obstetrics and Gynecology  Attending Obstetrics History and Physical        CHIEF COMPLAINT:  contractions     HISTORY OF PRESENT ILLNESS:      The patient is a 34 y.o. female [id number], Patient's last menstrual period was 2018 (exact date). ,  at 36w6d. OB History        4    Para   2    Term   2       0    AB   1    Living   2       SAB   1    TAB        Ectopic        Molar        Multiple        Live Births                Patient presents with a chief complaint as above. Estimated Due Date: Estimated Date of Delivery: 19    PRENATAL CARE:    Complicated by:   Patient Active Problem List   Diagnosis Code    Hypothyroidism E03.9    Pelvic pressure in pregnancy, second trimester O26.892, N94.9    30 weeks gestation of pregnancy Z3A.30    Abdominal cramping affecting pregnancy, antepartum O26.899, R10.9    Decreased fetal movements in third trimester O42.0    36 weeks gestation of pregnancy Z3A.36       PAST OB HISTORY  OB History        4    Para   2    Term   2       0    AB   1    Living   2       SAB   1    TAB        Ectopic        Molar        Multiple        Live Births                    Past Medical History:        Diagnosis Date    Anxiety     Headache     Hypothyroidism      Past Surgical History:        Procedure Laterality Date    CHOLECYSTECTOMY       Social History:    TOBACCO:   reports that she quit smoking about 6 months ago. Her smoking use included cigarettes. She has a 0.25 pack-year smoking history. She has never used smokeless tobacco.  ETOH:   reports that she does not drink alcohol. DRUGS:   reports that she does not use drugs.   Family History:       Problem Relation Age of Onset    Arrhythmia Mother         SVT s/p ablation    Cancer Father         skin (2017) & lymphoma (2015)     Medications Prior to Admission:  Medications Prior to Admission: Prenatal MV-Min-Fe Fum-FA-DHA (PRENATAL 1 PO), Take by mouth Lab Results   Component Value Date    WBC 9.9 11/13/2018    RBC 4.49 11/13/2018    HGB 13.3 11/13/2018    HCT 38.5 11/13/2018    MCV 85.7 11/13/2018    RDW 12.9 11/13/2018     11/13/2018     CMP:    Lab Results   Component Value Date     11/13/2018    K 3.6 11/13/2018     11/13/2018    CO2 24 11/13/2018    BUN 10 11/13/2018    PROT 7.6 11/13/2018     U/A:  No components found for: Erica De Leon, KAYLEE, Bernard Walls, PRESTON, UBILI, UBLOOD, Vish, UUROBIL, [place], [facility], [place], [facility], [place], [place], [place]    ASSESSMENT AND PLAN:  IUP at 39 weeks  R/o labor  Dr. Monse Simmons for skarote   notified      Electronically signed by Lorenzo Judge MD on 5/5/2019 at 8:19 PM

## 2019-05-06 NOTE — PROGRESS NOTES
Dr. Marcelino Olivas called in and updated on FHTs, ctx pattern, and cervical exam, states to continue to monitor patient.

## 2019-05-06 NOTE — PROGRESS NOTES
Dr. Vonda Saunders phoned in: updated on last SVE at 0730 was 4/70/-2, reviewed times and SVE throughout admission. Order to recheck when stadol wears off and call with update.

## 2019-05-06 NOTE — PROGRESS NOTES
Dr. Speedy Patel updated on cervical exam of 4cm and that patient is having increased pain with contractions, states to move patient to labor and delivery and admit.

## 2019-05-06 NOTE — PROGRESS NOTES
Updated Dr. Sheila Richter on urine results, ctx pattern, FHTs and cervical exam of 3-4cm, states to continue to watch patient, recheck in a couple hours.

## 2019-05-06 NOTE — ANESTHESIA PRE PROCEDURE
Department of Anesthesiology  Preprocedure Note       Name:  Katelyn Valles   Age:  34 y.o.  :  1990                                          MRN:  [id number]         Date:  2019      Surgeon: Yonas Long  Procedure: ANESTHESIA LABOR ANALGESIA    Medications prior to admission:   Prior to Admission medications    Medication Sig Start Date End Date Taking?  Authorizing Provider   Prenatal MV-Min-Fe Fum-FA-DHA (PRENATAL 1 PO) Take by mouth daily   Yes Historical Provider, MD   levothyroxine (SYNTHROID) 100 MCG tablet Take 1 tablet by mouth Daily Take 112 mcg MWF, take 100 mcg all other days  Patient taking differently: Take 112 mcg by mouth Daily  10/9/18  Yes Gena Cruz MD   metoclopramide (REGLAN) 10 MG tablet Take 1 tablet by mouth 3 times daily as needed (nausea, vomiting) 18   Judy Vasquez MD       Current medications:    Current Facility-Administered Medications   Medication Dose Route Frequency Provider Last Rate Last Dose    lactated ringers infusion   Intravenous Continuous Maryan Allen  mL/hr at 19 0130      ondansetron (ZOFRAN) injection 4 mg  4 mg Intravenous Q6H PRN Maryan Allen,         butorphanol (STADOL) injection 2 mg  2 mg Intravenous Q3H PRN Maryan Allen, DO   2 mg at 19 0744    lactated ringers infusion   Intravenous Continuous Maryan Padilla MD   Stopped at 19 0130       Allergies:  No Known Allergies    Problem List:    Patient Active Problem List   Diagnosis Code    Hypothyroidism E03.9    Pelvic pressure in pregnancy, second trimester O26.892, N94.9    30 weeks gestation of pregnancy Z3A.30    Abdominal cramping affecting pregnancy, antepartum O26.899, R10.9    Decreased fetal movements in third trimester O36.8130    36 weeks gestation of pregnancy Z3A.36    Pregnant and not yet delivered in third trimester Z34.93    37 weeks gestation of pregnancy Z3A.37       Past Medical History:        Diagnosis Date    input(s): POCGLU, POCNA, POCK, POCCL, POCBUN, POCHEMO, POCHCT in the last 72 hours. Coags: No results found for: PROTIME, INR, APTT    HCG (If Applicable):   Lab Results   Component Value Date    PREGTESTUR positive 11/13/2018        ABGs: No results found for: PHART, PO2ART, XVQ4YHU, LMZ6UML, BEART, K7UWHQBW     Type & Screen (If Applicable):  No results found for: LABABO, [address]    Anesthesia Evaluation  Patient summary reviewed and Nursing notes reviewed no history of anesthetic complications:   Airway: Mallampati: II  TM distance: >3 FB   Neck ROM: full  Mouth opening: > = 3 FB Dental: normal exam         Pulmonary:Negative Pulmonary ROS and normal exam  breath sounds clear to auscultation                             Cardiovascular:Negative CV ROS            Rhythm: regular  Rate: normal           Beta Blocker:  Not on Beta Blocker         Neuro/Psych:   (+) headaches:,             GI/Hepatic/Renal: Neg GI/Hepatic/Renal ROS            Endo/Other:    (+) hypothyroidism::., .                  ROS comment: 37 weeks gestation of pregnancy Abdominal:   (+) obese,         Vascular: negative vascular ROS. Anesthesia Plan      general, spinal and epidural     ASA 2     (General vs spinal vs epidural discussed and questions answered. Pt agrees to epidural when ready)        Anesthetic plan and risks discussed with patient and spouse. Plan discussed with CRNA and attending.                   Duncan Ren RN   5/6/2019

## 2019-05-07 ENCOUNTER — HOSPITAL ENCOUNTER (OUTPATIENT)
Age: 29
Setting detail: OBSERVATION
Discharge: HOME | End: 2019-05-08
Attending: SPECIALIST | Admitting: SPECIALIST
Payer: OTHER GOVERNMENT

## 2019-05-07 PROBLEM — Z34.93 NORMAL PREGNANCY, THIRD TRIMESTER: Status: ACTIVE | Noted: 2019-05-07

## 2019-05-07 LAB
ABO/RH: NORMAL
ANTIBODY IDENTIFICATION: NORMAL
ANTIBODY SCREEN: NORMAL
BACTERIA: ABNORMAL /HPF
BILIRUBIN URINE: NEGATIVE
BLOOD, URINE: NEGATIVE
CLARITY: ABNORMAL
COLOR: YELLOW
DAT POLYSPECIFIC: NORMAL
EPITHELIAL CELLS, UA: ABNORMAL /HPF
GLUCOSE URINE: NEGATIVE MG/DL
HCT VFR BLD CALC: 28.8 % (ref 34–48)
HEMOGLOBIN: 9.6 G/DL (ref 11.5–15.5)
KETONES, URINE: NEGATIVE MG/DL
LEUKOCYTE ESTERASE, URINE: ABNORMAL
MCH RBC QN AUTO: 28.2 PG (ref 26–35)
MCHC RBC AUTO-ENTMCNC: 33.3 % (ref 32–34.5)
MCV RBC AUTO: 84.7 FL (ref 80–99.9)
NITRITE, URINE: NEGATIVE
PDW BLD-RTO: 13.1 FL (ref 11.5–15)
PH UA: 7 (ref 5–9)
PLATELET # BLD: 320 E9/L (ref 130–450)
PMV BLD AUTO: 9.3 FL (ref 7–12)
PROTEIN UA: NEGATIVE MG/DL
RBC # BLD: 3.4 E12/L (ref 3.5–5.5)
RBC UA: ABNORMAL /HPF (ref 0–2)
SPECIFIC GRAVITY UA: 1.01 (ref 1–1.03)
UROBILINOGEN, URINE: 1 E.U./DL
WBC # BLD: 11.9 E9/L (ref 4.5–11.5)
WBC UA: ABNORMAL /HPF (ref 0–5)
YEAST: ABNORMAL

## 2019-05-07 PROCEDURE — 86880 COOMBS TEST DIRECT: CPT

## 2019-05-07 PROCEDURE — G0378 HOSPITAL OBSERVATION PER HR: HCPCS

## 2019-05-07 PROCEDURE — 86850 RBC ANTIBODY SCREEN: CPT

## 2019-05-07 PROCEDURE — 2580000003 HC RX 258: Performed by: OBSTETRICS & GYNECOLOGY

## 2019-05-07 PROCEDURE — 86901 BLOOD TYPING SEROLOGIC RH(D): CPT

## 2019-05-07 PROCEDURE — 36415 COLL VENOUS BLD VENIPUNCTURE: CPT

## 2019-05-07 PROCEDURE — 86900 BLOOD TYPING SEROLOGIC ABO: CPT

## 2019-05-07 PROCEDURE — 86870 RBC ANTIBODY IDENTIFICATION: CPT

## 2019-05-07 PROCEDURE — 85027 COMPLETE CBC AUTOMATED: CPT

## 2019-05-07 PROCEDURE — 81001 URINALYSIS AUTO W/SCOPE: CPT

## 2019-05-07 RX ORDER — SODIUM CHLORIDE, SODIUM LACTATE, POTASSIUM CHLORIDE, CALCIUM CHLORIDE 600; 310; 30; 20 MG/100ML; MG/100ML; MG/100ML; MG/100ML
INJECTION, SOLUTION INTRAVENOUS CONTINUOUS
Status: DISCONTINUED | OUTPATIENT
Start: 2019-05-07 | End: 2019-05-08 | Stop reason: HOSPADM

## 2019-05-07 RX ADMIN — SODIUM CHLORIDE, POTASSIUM CHLORIDE, SODIUM LACTATE AND CALCIUM CHLORIDE: 600; 310; 30; 20 INJECTION, SOLUTION INTRAVENOUS at 22:13

## 2019-05-07 ASSESSMENT — PAIN SCALES - GENERAL: PAINLEVEL_OUTOF10: 8

## 2019-05-07 NOTE — H&P
tobacco.  ETOH:   reports that she does not drink alcohol. DRUGS:   reports that she does not use drugs. Family History:       Problem Relation Age of Onset    Arrhythmia Mother         SVT s/p ablation    Cancer Father         skin (2017) & lymphoma (2015)       Medications Prior to Admission:  Medications Prior to Admission: Prenatal MV-Min-Fe Fum-FA-DHA (PRENATAL 1 PO), Take by mouth daily  metoclopramide (REGLAN) 10 MG tablet, Take 1 tablet by mouth 3 times daily as needed (nausea, vomiting)  levothyroxine (SYNTHROID) 100 MCG tablet, Take 1 tablet by mouth Daily Take 112 mcg MWF, take 100 mcg all other days (Patient taking differently: Take 112 mcg by mouth Daily )    Allergies:  Patient has no known allergies. Review of Systems:     REVIEW OF SYSTEMS:          CONSTITUTIONAL :      No fever, no chills   HEENT :                         No headache, no visual changes  CARDIOVASCULAR :    No pain, no palpitations, no edema   RESPIRATORY :            No pain, no shortness of breath   GASTROINTESTINAL : No N/V, no D/C, denies abdominal pain  GENITOURINARY :       No dysuria, hematuria and no incontinence   MUSCULOSKELETAL:  No myalgia, denies back pain   NEUROLOGICAL :        No history of seizures. PHYSICAL EXAM:    LMP 07/29/2018 (Exact Date)     General appearance:  awake, alert, cooperative, no apparent distress, and appears stated age  Neurologic:  Awake, alert, oriented to name, place and time.   Ambulatory to unit      Lungs:  Respirations easy     Abdomen:   soft, gravid, non-tender, no CVA tenderness   Fetal position vertex   EFW AGA  Fetal heart rate:  Baseline Heart Rate 135   Variability mod   + accelerations   no decelerations  Pelvis:  External Genitalia: no lesions  SSE:  NA  Cervix:  DILATION:  4 cm  EFFACEMENT:   50%  STATION:  -2  POSITION:  mid  CONSISTENCY:  soft    Contractions:  q 9 minutes on toco, has had only 2 contractions since admission  Membranes:  intact       GBS  Unknown at this time       Impression:  IUP at 37.1 weeks gestation, contractions, category I tracing    Discussed with Dr. Lei Orf:  Observe, send UA, recheck in a few hours, call Dr. Omer Veliz as he is signing out to her        Electronically signed by Paulie Martinez on 5/7/2019 at 5:41 PM

## 2019-05-08 VITALS
BODY MASS INDEX: 31.21 KG/M2 | HEIGHT: 70 IN | SYSTOLIC BLOOD PRESSURE: 129 MMHG | HEART RATE: 85 BPM | DIASTOLIC BLOOD PRESSURE: 59 MMHG | WEIGHT: 218 LBS | TEMPERATURE: 98.1 F | RESPIRATION RATE: 16 BRPM

## 2019-05-08 PROCEDURE — G0378 HOSPITAL OBSERVATION PER HR: HCPCS

## 2019-05-08 NOTE — PROGRESS NOTES
Discharge instructions given, patient provided signature as written understanding. Patient ambulatory home with no complaints and in no apparent distress.

## 2019-05-08 NOTE — PROGRESS NOTES
In to discuss plan of care with patient. Plan for discharge. IV removed. Patient up to change into street clothes.

## 2019-05-08 NOTE — PROGRESS NOTES
Dr Rivera Cancer updated on [address] [facility], and cervical exam.  Orders obtained, patient to be monitored over night

## 2019-05-08 NOTE — PROGRESS NOTES
In for assessment, patient was sleeping, awakened when this RN called her name. Patient states that contractions are much less painful and less frequent than when she came in, and she would like to go home. SVE per writer.

## 2019-05-10 ENCOUNTER — HOSPITAL ENCOUNTER (OUTPATIENT)
Age: 29
Discharge: HOME | End: 2019-05-10
Attending: SPECIALIST | Admitting: OBSTETRICS & GYNECOLOGY
Payer: OTHER GOVERNMENT

## 2019-05-10 VITALS
HEART RATE: 76 BPM | BODY MASS INDEX: 32.07 KG/M2 | HEIGHT: 70 IN | RESPIRATION RATE: 18 BRPM | TEMPERATURE: 98.9 F | WEIGHT: 224 LBS | DIASTOLIC BLOOD PRESSURE: 60 MMHG | SYSTOLIC BLOOD PRESSURE: 119 MMHG

## 2019-05-10 PROCEDURE — 84112 EVAL AMNIOTIC FLUID PROTEIN: CPT

## 2019-05-10 PROCEDURE — 99212 OFFICE O/P EST SF 10 MIN: CPT

## 2019-05-10 NOTE — PROGRESS NOTES
Dr. Dorys Eckert on unit. Updated on patient's arrival. Orders received to recheck patient in 2 hours, call with update.

## 2019-05-10 NOTE — PROGRESS NOTES
Patient presents to labor and delivery for complaints of contractions every 5 minutes that began at 1500 today. States feeling LOF 2 days ago. Denies vaginal bleeding. States good fetal movement. IUP at 37.4 weeks, , previous vaginal deliveries at term. GBS negative per pt, 4cm in office on Wednesday. Placed on efm. Amnisure pending.

## 2019-05-11 NOTE — PROGRESS NOTES
Pt given dc inst and verbalized understanding. Pt educated on daily FM and verbalized Understanding. Pt educated on labor precautions and verbalized understanding. Pt left ambulatory in stable condition.

## 2019-05-13 ENCOUNTER — HOSPITAL ENCOUNTER (EMERGENCY)
Age: 29
Discharge: HOME | End: 2019-05-13
Payer: OTHER GOVERNMENT

## 2019-05-13 VITALS
OXYGEN SATURATION: 99 % | DIASTOLIC BLOOD PRESSURE: 72 MMHG | BODY MASS INDEX: 32.07 KG/M2 | TEMPERATURE: 98.7 F | WEIGHT: 224 LBS | HEART RATE: 85 BPM | HEIGHT: 70 IN | SYSTOLIC BLOOD PRESSURE: 141 MMHG | RESPIRATION RATE: 16 BRPM

## 2019-05-13 DIAGNOSIS — J02.0 STREPTOCOCCAL SORE THROAT: Primary | ICD-10-CM

## 2019-05-13 LAB — STREP GRP A PCR: POSITIVE

## 2019-05-13 PROCEDURE — 99283 EMERGENCY DEPT VISIT LOW MDM: CPT

## 2019-05-13 PROCEDURE — 6370000000 HC RX 637 (ALT 250 FOR IP): Performed by: PHYSICIAN ASSISTANT

## 2019-05-13 PROCEDURE — 87880 STREP A ASSAY W/OPTIC: CPT

## 2019-05-13 RX ORDER — AMOXICILLIN 250 MG/1
500 CAPSULE ORAL ONCE
Status: COMPLETED | OUTPATIENT
Start: 2019-05-13 | End: 2019-05-13

## 2019-05-13 RX ORDER — AMOXICILLIN 500 MG/1
500 CAPSULE ORAL 2 TIMES DAILY
Qty: 19 CAPSULE | Refills: 0 | Status: SHIPPED | OUTPATIENT
Start: 2019-05-13 | End: 2019-05-23

## 2019-05-13 RX ADMIN — AMOXICILLIN 500 MG: 250 CAPSULE ORAL at 23:28

## 2019-05-13 ASSESSMENT — PAIN DESCRIPTION - FREQUENCY: FREQUENCY: CONTINUOUS

## 2019-05-13 ASSESSMENT — PAIN DESCRIPTION - DESCRIPTORS: DESCRIPTORS: ACHING;CONSTANT;DISCOMFORT

## 2019-05-13 ASSESSMENT — PAIN DESCRIPTION - ONSET: ONSET: ON-GOING

## 2019-05-13 ASSESSMENT — PAIN DESCRIPTION - PROGRESSION: CLINICAL_PROGRESSION: NOT CHANGED

## 2019-05-13 ASSESSMENT — PAIN DESCRIPTION - PAIN TYPE: TYPE: ACUTE PAIN

## 2019-05-13 ASSESSMENT — PAIN SCALES - GENERAL: PAINLEVEL_OUTOF10: 8

## 2019-05-13 ASSESSMENT — PAIN DESCRIPTION - LOCATION: LOCATION: EAR;THROAT

## 2019-05-14 NOTE — ED NOTES
Assumed care of pt for discharge instructions, pt instructed on medications and follow up as instructed, pt verbalizes understanding discharged with steady gait      Daniel Dickerson  05/13/19 0722

## 2019-05-14 NOTE — ED PROVIDER NOTES
[facility]       Department of Emergency Medicine   ED  Provider Note  Admit Date/RoomTime: 5/13/2019 10:30 PM  ED Room: [facility], [facility]  Chief Complaint   Pharyngitis (started yesterday morning) and Otalgia    History of Present Illness   Source of history provided by:  patient. History/Exam Limitations: none. Estelle Nguyen is a 34 y.o. old female who has a past medical history of:   Past Medical History:   Diagnosis Date    Anxiety     Headache     Hypothyroidism     presents to the emergency department by private vehicle, for bilateral sore throat pain, which occured 2 day(s) prior to arrival. Associated Signs & Symptoms: no other symptoms Since onset the symptoms have been worsening. She is drinking plenty of fluids. Exposed To: Streptococcus: unknown. Infectious Mononucleosis:  unknown. Symptoms:  Pain:  Yes. Muffled Voice:  Yes. Hoarse:  No.                            Difficulty with Secretions:  No.    Centor Score (MODIFIED) For Strep Pharyngitis:    Age 3-14 Years   no (0)     Age >44 Years   NO     Exudate or Swelling on Tonsils   yes (1)     Tender/Swollen Anterior Cervical Nodes   yes (1)     Fever? (T > 38°C, 100.4°F)   no (0)     Absence of Cough   yes (1)   TOTAL POINTS   3   Score of Zero = Probability of Strep Pharyngitis: 1% - 2.5%. ,   No further testing nor antibiotics. Score of 1 = Probability of Strep Pharyngitis: 5% - 10%. ,   No further testing nor antibiotics. Score of 2 = Probability of Strep Phar: 11% - 17%. Culture/test all, ATB's only for positive culture results. Score of 3 = Probability of Strep Phar: 28% - 35%. Culture/test all, ATB's only for positive culture results  Score of 4 or 5 = Probability of Strep Pharyngitis: 51% - 53%. Treat empirically with antibiotics.     ROS    Pertinent positives and negatives are stated within HPI, all other systems reviewed and are negative. Past Surgical History:  has a past surgical history that includes Cholecystectomy (2012). Social History:  reports that she quit smoking about 6 months ago. Her smoking use included cigarettes. She has a 0.25 pack-year smoking history. She has never used smokeless tobacco. She reports that she does not drink alcohol or use drugs. Family History: family history includes Arrhythmia in her mother; Cancer in her father. Allergies: Patient has no known allergies. Physical Exam           ED Triage Vitals [05/13/19 2227]   BP Temp Temp Source Pulse Resp SpO2 Height Weight   (!) 141/72 98.7 °F (37.1 °C) Oral 85 16 99 % 5' 10\" (1.778 m) 224 lb (101.6 kg)     Oxygen Saturation Interpretation: Normal.    Constitutional:  Alert, development consistent with age. .  Ears:  TMs without perforation, injection, or bulging. External canals clear without exudate. Throat: Airway Patent. moderate erythema, tonsillar hypertrophy, 3+ and exudates present. Neck/Lymphatic:  Supple. There is Bilateral  anterior cervical node tenderness. respiratory:  Clear to auscultation and breath sounds equal.    CV: Regular rate and rhythm, normal heart sounds, without pathological murmurs, ectopy, gallops, or rubs. GI:  Abdomen Soft, nontender, good bowel sounds. No firm or pulsatile mass. Inegument:  No rashes, erythema present. Neurological:  Oriented. Motor functions intact. Lab / Imaging Results   (All laboratory and radiology results have been personally reviewed by myself)  Labs:  Results for orders placed or performed during the hospital encounter of 05/13/19   Strep Screen Group A Throat   Result Value Ref Range    Strep Grp A PCR POSITIVE Negative     Imaging: All Radiology results interpreted by Radiologist unless otherwise noted.   No orders to display       ED Course / Medical Decision Making     Medications   amoxicillin (AMOXIL) capsule 500 mg (has no administration in time range)

## 2019-05-21 ENCOUNTER — HOSPITAL ENCOUNTER (INPATIENT)
Age: 29
LOS: 2 days | Discharge: HOME | End: 2019-05-23
Attending: SPECIALIST | Admitting: SPECIALIST
Payer: OTHER GOVERNMENT

## 2019-05-21 ENCOUNTER — APPOINTMENT (OUTPATIENT)
Dept: LABOR AND DELIVERY | Age: 29
End: 2019-05-21
Payer: OTHER GOVERNMENT

## 2019-05-21 PROBLEM — Z3A.39 39 WEEKS GESTATION OF PREGNANCY: Status: ACTIVE | Noted: 2019-05-21

## 2019-05-21 LAB
ABO/RH: NORMAL
AMPHETAMINE SCREEN, URINE: NOT DETECTED
ANTIBODY SCREEN: NORMAL
BARBITURATE SCREEN URINE: NOT DETECTED
BENZODIAZEPINE SCREEN, URINE: NOT DETECTED
CANNABINOID SCREEN URINE: NOT DETECTED
COCAINE METABOLITE SCREEN URINE: NOT DETECTED
HCT VFR BLD CALC: 29.7 % (ref 34–48)
HEMOGLOBIN: 9.9 G/DL (ref 11.5–15.5)
MCH RBC QN AUTO: 27.9 PG (ref 26–35)
MCHC RBC AUTO-ENTMCNC: 33.3 % (ref 32–34.5)
MCV RBC AUTO: 83.7 FL (ref 80–99.9)
METHADONE SCREEN, URINE: NOT DETECTED
OPIATE SCREEN URINE: NOT DETECTED
PDW BLD-RTO: 13.3 FL (ref 11.5–15)
PHENCYCLIDINE SCREEN URINE: NOT DETECTED
PLATELET # BLD: 315 E9/L (ref 130–450)
PMV BLD AUTO: 9 FL (ref 7–12)
PROPOXYPHENE SCREEN: NOT DETECTED
RBC # BLD: 3.55 E12/L (ref 3.5–5.5)
WBC # BLD: 10 E9/L (ref 4.5–11.5)

## 2019-05-21 PROCEDURE — 1220000000 HC SEMI PRIVATE OB R&B

## 2019-05-21 PROCEDURE — 85027 COMPLETE CBC AUTOMATED: CPT

## 2019-05-21 PROCEDURE — 6370000000 HC RX 637 (ALT 250 FOR IP): Performed by: SPECIALIST

## 2019-05-21 PROCEDURE — 7200000001 HC VAGINAL DELIVERY

## 2019-05-21 PROCEDURE — 86901 BLOOD TYPING SEROLOGIC RH(D): CPT

## 2019-05-21 PROCEDURE — 86850 RBC ANTIBODY SCREEN: CPT

## 2019-05-21 PROCEDURE — 2580000003 HC RX 258: Performed by: SPECIALIST

## 2019-05-21 PROCEDURE — 10907ZC DRAINAGE OF AMNIOTIC FLUID, THERAPEUTIC FROM PRODUCTS OF CONCEPTION, VIA NATURAL OR ARTIFICIAL OPENING: ICD-10-PCS | Performed by: SPECIALIST

## 2019-05-21 PROCEDURE — 80307 DRUG TEST PRSMV CHEM ANLYZR: CPT

## 2019-05-21 PROCEDURE — 36415 COLL VENOUS BLD VENIPUNCTURE: CPT

## 2019-05-21 PROCEDURE — 6360000002 HC RX W HCPCS: Performed by: SPECIALIST

## 2019-05-21 PROCEDURE — 86900 BLOOD TYPING SEROLOGIC ABO: CPT

## 2019-05-21 RX ORDER — SODIUM CHLORIDE 0.9 % (FLUSH) 0.9 %
10 SYRINGE (ML) INJECTION PRN
Status: DISCONTINUED | OUTPATIENT
Start: 2019-05-21 | End: 2019-05-21 | Stop reason: HOSPADM

## 2019-05-21 RX ORDER — ACETAMINOPHEN 650 MG
TABLET, EXTENDED RELEASE ORAL
Status: COMPLETED
Start: 2019-05-21 | End: 2019-05-21

## 2019-05-21 RX ORDER — LEVOTHYROXINE SODIUM 0.12 MG/1
125 TABLET ORAL DAILY
Status: DISCONTINUED | OUTPATIENT
Start: 2019-05-22 | End: 2019-05-23 | Stop reason: HOSPADM

## 2019-05-21 RX ORDER — LANOLIN 100 %
OINTMENT (GRAM) TOPICAL PRN
Status: DISCONTINUED | OUTPATIENT
Start: 2019-05-21 | End: 2019-05-23 | Stop reason: HOSPADM

## 2019-05-21 RX ORDER — FERROUS SULFATE 325(65) MG
325 TABLET ORAL
Status: DISCONTINUED | OUTPATIENT
Start: 2019-05-22 | End: 2019-05-23 | Stop reason: HOSPADM

## 2019-05-21 RX ORDER — SODIUM CHLORIDE, SODIUM LACTATE, POTASSIUM CHLORIDE, CALCIUM CHLORIDE 600; 310; 30; 20 MG/100ML; MG/100ML; MG/100ML; MG/100ML
INJECTION, SOLUTION INTRAVENOUS CONTINUOUS
Status: DISCONTINUED | OUTPATIENT
Start: 2019-05-21 | End: 2019-05-21

## 2019-05-21 RX ORDER — DOCUSATE SODIUM 100 MG/1
100 CAPSULE, LIQUID FILLED ORAL 2 TIMES DAILY
Status: DISCONTINUED | OUTPATIENT
Start: 2019-05-21 | End: 2019-05-21 | Stop reason: HOSPADM

## 2019-05-21 RX ORDER — SODIUM CHLORIDE 0.9 % (FLUSH) 0.9 %
10 SYRINGE (ML) INJECTION PRN
Status: DISCONTINUED | OUTPATIENT
Start: 2019-05-21 | End: 2019-05-23 | Stop reason: HOSPADM

## 2019-05-21 RX ORDER — ONDANSETRON 2 MG/ML
4 INJECTION INTRAMUSCULAR; INTRAVENOUS EVERY 6 HOURS PRN
Status: DISCONTINUED | OUTPATIENT
Start: 2019-05-21 | End: 2019-05-21 | Stop reason: HOSPADM

## 2019-05-21 RX ORDER — SODIUM CHLORIDE 0.9 % (FLUSH) 0.9 %
10 SYRINGE (ML) INJECTION EVERY 12 HOURS SCHEDULED
Status: DISCONTINUED | OUTPATIENT
Start: 2019-05-21 | End: 2019-05-21

## 2019-05-21 RX ORDER — ACETAMINOPHEN 325 MG/1
650 TABLET ORAL EVERY 4 HOURS PRN
Status: DISCONTINUED | OUTPATIENT
Start: 2019-05-21 | End: 2019-05-23 | Stop reason: HOSPADM

## 2019-05-21 RX ORDER — DOCUSATE SODIUM 100 MG/1
100 CAPSULE, LIQUID FILLED ORAL 2 TIMES DAILY
Status: DISCONTINUED | OUTPATIENT
Start: 2019-05-21 | End: 2019-05-23 | Stop reason: HOSPADM

## 2019-05-21 RX ORDER — LIDOCAINE HYDROCHLORIDE 10 MG/ML
INJECTION, SOLUTION INFILTRATION; PERINEURAL
Status: DISCONTINUED
Start: 2019-05-21 | End: 2019-05-21

## 2019-05-21 RX ORDER — SODIUM CHLORIDE, SODIUM LACTATE, POTASSIUM CHLORIDE, CALCIUM CHLORIDE 600; 310; 30; 20 MG/100ML; MG/100ML; MG/100ML; MG/100ML
INJECTION, SOLUTION INTRAVENOUS CONTINUOUS
Status: DISCONTINUED | OUTPATIENT
Start: 2019-05-21 | End: 2019-05-23 | Stop reason: HOSPADM

## 2019-05-21 RX ORDER — AMOXICILLIN 250 MG/1
500 CAPSULE ORAL 2 TIMES DAILY
Status: DISCONTINUED | OUTPATIENT
Start: 2019-05-21 | End: 2019-05-23 | Stop reason: HOSPADM

## 2019-05-21 RX ORDER — ACETAMINOPHEN 650 MG
TABLET, EXTENDED RELEASE ORAL
Status: DISCONTINUED
Start: 2019-05-21 | End: 2019-05-21

## 2019-05-21 RX ORDER — SODIUM CHLORIDE 0.9 % (FLUSH) 0.9 %
10 SYRINGE (ML) INJECTION EVERY 12 HOURS SCHEDULED
Status: DISCONTINUED | OUTPATIENT
Start: 2019-05-21 | End: 2019-05-23 | Stop reason: HOSPADM

## 2019-05-21 RX ORDER — IBUPROFEN 800 MG/1
800 TABLET ORAL EVERY 8 HOURS PRN
Status: DISCONTINUED | OUTPATIENT
Start: 2019-05-21 | End: 2019-05-23 | Stop reason: HOSPADM

## 2019-05-21 RX ADMIN — BUTORPHANOL TARTRATE 1 MG: 2 INJECTION, SOLUTION INTRAMUSCULAR; INTRAVENOUS at 14:16

## 2019-05-21 RX ADMIN — AMOXICILLIN 500 MG: 250 CAPSULE ORAL at 21:03

## 2019-05-21 RX ADMIN — Medication: at 16:26

## 2019-05-21 RX ADMIN — SODIUM CHLORIDE, POTASSIUM CHLORIDE, SODIUM LACTATE AND CALCIUM CHLORIDE: 600; 310; 30; 20 INJECTION, SOLUTION INTRAVENOUS at 10:15

## 2019-05-21 RX ADMIN — DOCUSATE SODIUM 100 MG: 100 CAPSULE, LIQUID FILLED ORAL at 21:03

## 2019-05-21 RX ADMIN — Medication 1 MILLI-UNITS/MIN: at 10:45

## 2019-05-21 RX ADMIN — IBUPROFEN 800 MG: 800 TABLET, FILM COATED ORAL at 21:03

## 2019-05-21 ASSESSMENT — PAIN SCALES - GENERAL
PAINLEVEL_OUTOF10: 3
PAINLEVEL_OUTOF10: 9

## 2019-05-21 NOTE — PROGRESS NOTES
Cervix 4 cm/80%/-1  Membranes artificially ruptured, clear fluid noted      Assessment:  1-IUP at 39 weeks and 1 day  2-artificial rupture membranes    Plan:  1-epidural upon patient request electrolytes and continue Pitocin

## 2019-05-21 NOTE — PROGRESS NOTES
Patient is a , 39.1 who presents to labor and delivery for a scheduled induction of labor. Patient has no c/o of VB, lof and states positive FM. Patient was placed on EFM and resting comfortably in bed with call light in reach.

## 2019-05-22 LAB
FETAL SCREEN: NORMAL
RHIG LOT NUMBER: NORMAL

## 2019-05-22 PROCEDURE — 36415 COLL VENOUS BLD VENIPUNCTURE: CPT

## 2019-05-22 PROCEDURE — 6360000002 HC RX W HCPCS: Performed by: SPECIALIST

## 2019-05-22 PROCEDURE — 85461 HEMOGLOBIN FETAL: CPT

## 2019-05-22 PROCEDURE — 1220000000 HC SEMI PRIVATE OB R&B

## 2019-05-22 PROCEDURE — 6370000000 HC RX 637 (ALT 250 FOR IP): Performed by: SPECIALIST

## 2019-05-22 RX ADMIN — FERROUS SULFATE TAB 325 MG (65 MG ELEMENTAL FE) 325 MG: 325 (65 FE) TAB at 18:51

## 2019-05-22 RX ADMIN — Medication: at 09:04

## 2019-05-22 RX ADMIN — LEVOTHYROXINE SODIUM 125 MCG: 125 TABLET ORAL at 06:26

## 2019-05-22 RX ADMIN — IBUPROFEN 800 MG: 800 TABLET, FILM COATED ORAL at 18:51

## 2019-05-22 RX ADMIN — AMOXICILLIN 500 MG: 250 CAPSULE ORAL at 21:00

## 2019-05-22 RX ADMIN — IBUPROFEN 800 MG: 800 TABLET, FILM COATED ORAL at 06:26

## 2019-05-22 RX ADMIN — AMOXICILLIN 500 MG: 250 CAPSULE ORAL at 09:05

## 2019-05-22 RX ADMIN — DOCUSATE SODIUM 100 MG: 100 CAPSULE, LIQUID FILLED ORAL at 09:05

## 2019-05-22 RX ADMIN — FERROUS SULFATE TAB 325 MG (65 MG ELEMENTAL FE) 325 MG: 325 (65 FE) TAB at 09:05

## 2019-05-22 RX ADMIN — DOCUSATE SODIUM 100 MG: 100 CAPSULE, LIQUID FILLED ORAL at 19:55

## 2019-05-22 RX ADMIN — HUMAN RHO(D) IMMUNE GLOBULIN 300 MCG: 300 INJECTION, SOLUTION INTRAMUSCULAR at 13:06

## 2019-05-22 ASSESSMENT — PAIN DESCRIPTION - RADICULAR PAIN
RADICULAR_PAIN: ABSENT

## 2019-05-22 ASSESSMENT — PAIN SCALES - GENERAL
PAINLEVEL_OUTOF10: 1
PAINLEVEL_OUTOF10: 3

## 2019-05-22 NOTE — LACTATION NOTE
Pt states baby is latching well for her. Encouraged to offer frequent feedings, signs of adequate I & O reviewed. Pt requests electric breast pump for home to increase her milk supply.

## 2019-05-22 NOTE — FLOWSHEET NOTE
Pt admitted to 315, oriented to room and unit routine, infant safety discussed, admission papers reviewed, pt up to bathroom and voided qs, lochia rna, instructed on pericare, pt comfortable

## 2019-05-23 VITALS
HEIGHT: 70 IN | DIASTOLIC BLOOD PRESSURE: 68 MMHG | HEART RATE: 67 BPM | RESPIRATION RATE: 16 BRPM | TEMPERATURE: 97.7 F | BODY MASS INDEX: 31.78 KG/M2 | WEIGHT: 222 LBS | SYSTOLIC BLOOD PRESSURE: 120 MMHG

## 2019-05-23 PROCEDURE — 6370000000 HC RX 637 (ALT 250 FOR IP): Performed by: SPECIALIST

## 2019-05-23 RX ORDER — IBUPROFEN 800 MG/1
800 TABLET ORAL EVERY 8 HOURS PRN
Qty: 24 TABLET | Refills: 0 | Status: SHIPPED | OUTPATIENT
Start: 2019-05-23 | End: 2020-04-03 | Stop reason: ALTCHOICE

## 2019-05-23 RX ADMIN — AMOXICILLIN 500 MG: 250 CAPSULE ORAL at 08:36

## 2019-05-23 RX ADMIN — DOCUSATE SODIUM 100 MG: 100 CAPSULE, LIQUID FILLED ORAL at 08:35

## 2019-05-23 RX ADMIN — IBUPROFEN 800 MG: 800 TABLET, FILM COATED ORAL at 08:35

## 2019-05-23 RX ADMIN — LEVOTHYROXINE SODIUM 125 MCG: 125 TABLET ORAL at 05:54

## 2019-05-23 RX ADMIN — FERROUS SULFATE TAB 325 MG (65 MG ELEMENTAL FE) 325 MG: 325 (65 FE) TAB at 08:35

## 2019-05-23 ASSESSMENT — PAIN SCALES - GENERAL: PAINLEVEL_OUTOF10: 2

## 2019-05-23 NOTE — PROGRESS NOTES
Post Partum Vaginal Delivery Progress Note    PPD# 2 s/p     SUBJECTIVE:  No complaints.        Vitals:  Vitals:    19 0719 19 1159 19 1945 19 0700   BP: 132/62 128/67 (!) 123/57 120/68   Pulse: 72 77 75 67   Resp: 18 18 16 16   Temp: 98.2 °F (36.8 °C) 98.2 °F (36.8 °C) 98.5 °F (36.9 °C) 97.7 °F (36.5 °C)   TempSrc: Oral Oral Oral Oral   Weight:       Height:           Exam:  Fundus firm, non-tender, normal lochia  Extremities non-tender    Labs:   Lab Results   Component Value Date    WBC 10.0 2019    HGB 9.9 (L) 2019    HCT 29.7 (L) 2019    MCV 83.7 2019     2019       ASSESSMENT & PLAN:  PPD # 2  Patient Active Problem List   Diagnosis    Hypothyroidism    Pelvic pressure in pregnancy, second trimester    30 weeks gestation of pregnancy    Abdominal cramping affecting pregnancy, antepartum    Decreased fetal movements in third trimester    36 weeks gestation of pregnancy    Pregnant and not yet delivered in third trimester    37 weeks gestation of pregnancy    Normal pregnancy, third trimester    39 weeks gestation of pregnancy     -Continue routine postpartum care  -Postpartum Hgb 9.9  -Male infant, s/p circumcision  -Discharge home with follow up in 6 weeks, instructions discussed    Cecily Leija MD  [facility]

## 2019-06-03 NOTE — H&P
[id number] [address] [address]                       PREOPERATIVE HISTORY AND PHYSICAL    PATIENT NAME: Daria Marie                   :        1990  MED REC NO:   [id number]                            ROOM:       0315  ACCOUNT NO:   [de-identified]                           ADMIT DATE: 2019  PROVIDER:     Alex Dominique MD    BRIEF HISTORY OF PRESENT ILLNESS:  She is a 77-year-old  4, para  2, at 39 weeks and 1 day who presented for induction of labor. PAST MEDICAL HISTORY:  Positive for hypothyroidism and anxiety. PAST SURGICAL HISTORY:  Cholecystectomy. FAMILY HISTORY:  Mother:  SVT. Father:  Lymphoma. MEDICATIONS:  Synthroid and prenatal vitamin. ALLERGIES:  None. SOCIAL HISTORY:  The patient does not smoke, drink alcohol, or use  illegal IV drugs. PHYSICAL EXAMINATION:  ABDOMEN:  Soft, nontender. UTERUS:  Soft, nontender. Gravid. EXTREMITIES:  Nontender. No Delmar's. ASSESSMENT:  IUP at 39 and 1. PLAN:  Induction of labor.         Shekhar Echols MD    D: 2019 18:55:34       T: 2019 22:23:23     [email or web address]  Job#: [id number]     Doc#: [id number]    CC:

## 2019-06-17 ENCOUNTER — TELEPHONE (OUTPATIENT)
Dept: FAMILY MEDICINE CLINIC | Age: 29
End: 2019-06-17

## 2019-06-17 NOTE — TELEPHONE ENCOUNTER
Pt phoned this AM, she wants lab orders drawn up wants to recheck TSH, once in system she will go to [facility]

## 2019-06-19 DIAGNOSIS — E03.9 HYPOTHYROIDISM, UNSPECIFIED TYPE: Primary | ICD-10-CM

## 2019-06-21 ENCOUNTER — OFFICE VISIT (OUTPATIENT)
Dept: FAMILY MEDICINE CLINIC | Age: 29
End: 2019-06-21
Payer: OTHER GOVERNMENT

## 2019-06-21 VITALS
OXYGEN SATURATION: 100 % | BODY MASS INDEX: 28.63 KG/M2 | HEART RATE: 54 BPM | HEIGHT: 70 IN | SYSTOLIC BLOOD PRESSURE: 133 MMHG | RESPIRATION RATE: 14 BRPM | WEIGHT: 200 LBS | DIASTOLIC BLOOD PRESSURE: 76 MMHG

## 2019-06-21 DIAGNOSIS — G43.809 OTHER MIGRAINE WITHOUT STATUS MIGRAINOSUS, NOT INTRACTABLE: Primary | ICD-10-CM

## 2019-06-21 PROCEDURE — 99213 OFFICE O/P EST LOW 20 MIN: CPT | Performed by: FAMILY MEDICINE

## 2019-06-21 RX ORDER — SUMATRIPTAN 50 MG/1
50 TABLET, FILM COATED ORAL
Qty: 9 TABLET | Refills: 3 | Status: SHIPPED
Start: 2019-06-21 | End: 2020-05-22

## 2019-06-21 ASSESSMENT — PATIENT HEALTH QUESTIONNAIRE - PHQ9
SUM OF ALL RESPONSES TO PHQ9 QUESTIONS 1 & 2: 0
SUM OF ALL RESPONSES TO PHQ QUESTIONS 1-9: 0
2. FEELING DOWN, DEPRESSED OR HOPELESS: 0
SUM OF ALL RESPONSES TO PHQ QUESTIONS 1-9: 0
1. LITTLE INTEREST OR PLEASURE IN DOING THINGS: 0

## 2019-06-21 NOTE — PROGRESS NOTES
Progress Note    Subjective: Kary Leggett is a 34y.o. year old female here for HA. Health Maintenance Due   Topic Date Due    Varicella Vaccine (1 of 2 - 13+ 2-dose series) 2005    HPV vaccine (1 - Female 3-dose series) 2007    Cervical cancer screen  2013       Gets aura with some   L side of head. Throbbing. No n/v.   Helps to lie down in a dark room, but often can power through the HA. Helped with excedrin  phono and photophobia. No FND aside from some numbness in both hands and mouth. Delivered new baby a month ago. Has not been getting much sleep. Got these HAs 5 times this week. Before then they were rare.      Declined hpv today    Past Medical History:   Diagnosis Date    Anxiety     Headache     Hypothyroidism      Past Surgical History:   Procedure Laterality Date    CHOLECYSTECTOMY       Family History   Problem Relation Age of Onset    Arrhythmia Mother         SVT s/p ablation    Cancer Father         skin (2017) & lymphoma ()     Social History     Socioeconomic History    Marital status:      Spouse name: Not on file    Number of children: Not on file    Years of education: Not on file    Highest education level: Not on file   Occupational History    Not on file   Social Needs    Financial resource strain: Not on file    Food insecurity:     Worry: Not on file     Inability: Not on file    Transportation needs:     Medical: Not on file     Non-medical: Not on file   Tobacco Use    Smoking status: Former Smoker     Packs/day: 0.25     Years: 1.00     Pack years: 0.25     Types: Cigarettes     Last attempt to quit: 2018     Years since quittin.6    Smokeless tobacco: Never Used   Substance and Sexual Activity    Alcohol use: No    Drug use: No    Sexual activity: Yes     Partners: Male     Birth control/protection: Pill   Lifestyle    Physical activity:     Days per week: Not on file     Minutes per session: Not on file    Stress: Not on file   Relationships    Social connections:     Talks on phone: Not on file     Gets together: Not on file     Attends [employer] service: Not on file     Active member of club or organization: Not on file     Attends meetings of clubs or organizations: Not on file     Relationship status: Not on file    Intimate partner violence:     Fear of current or ex partner: Not on file     Emotionally abused: Not on file     Physically abused: Not on file     Forced sexual activity: Not on file   Other Topics Concern    Not on file   Social History Narrative    Not on file       Review Of Systems (bold are positive, all else are negative)  Gen: No fevers, sweats, chills  No fatigue  No unintentional weight changes  Resp: No cough, shortness of breath, wheeze, chest congestion  CV: No chest pain, palpitation, edema  GI: No abdominal pain  No nausea, no vomiting  No change in bowels, no diarrhea or constipation  No blood in stool or blood per rectum  Neuro: No headaches  No syncope/near syncope  No dizziness      Objective:  /76 (Site: Left Upper Arm)   Pulse 54   Resp 14   Ht 5' 10\" (1.778 m)   Wt 200 lb (90.7 kg)   LMP 07/29/2018 (Exact Date)   SpO2 100%   BMI 28.70 kg/m²   General appearance: NAD, alert and interacting appropriately  HEENT: NCAT, PERRLA, EOMI   Extremities: No clubbing, cyanosis, or edema. Warm. Dry. Neuro: motor 5/5 UEs and LEs, sensation intact, b/l  2+, normal ambulation, CN II-XII intact, AUDRA and FTN wnl. Romberg neg. I have reviewed this patient's previous records. I have reviewed this patient's medications. Assessment/Plan:    Fabienne Sanchez was seen today for migraine. Diagnoses and all orders for this visit:    Other migraine without status migrainosus, not intractable  -     SUMAtriptan (IMITREX) 50 MG tablet;  Take 1 tablet by mouth once as needed for Migraine    Keep a headache journal.     DDx: migraine > tension > cluster vs IIH          Return in about 1 month (around 7/21/2019) for headache.         Electronically signed by Vaibhav Renteria MD on 6/21/2019 at 9:58 AM

## 2019-06-21 NOTE — PATIENT INSTRUCTIONS
Keep a headache journal.       Patient Education        Migraine Headache: Care Instructions  Your Care Instructions  Migraines are painful, throbbing headaches that often start on one side of the head. They may cause nausea and vomiting and make you sensitive to light, sound, or smell. Without treatment, migraines can last from 4 hours to a few days. Medicines can help prevent migraines or stop them after they have started. Your doctor can help you find which ones work best for you. Follow-up care is a key part of your treatment and safety. Be sure to make and go to all appointments, and call your doctor if you are having problems. It's also a good idea to know your test results and keep a list of the medicines you take. How can you care for yourself at home? · Do not drive if you have taken a prescription pain medicine. · Rest in a quiet, dark room until your headache is gone. Close your eyes, and try to relax or go to sleep. Don't watch TV or read. · Put a cold, moist cloth or cold pack on the painful area for 10 to 20 minutes at a time. Put a thin cloth between the cold pack and your skin. · Use a warm, moist towel or a heating pad set on low to relax tight shoulder and neck muscles. · Have someone gently massage your neck and shoulders. · Take your medicines exactly as prescribed. Call your doctor if you think you are having a problem with your medicine. You will get more details on the specific medicines your doctor prescribes. · Be careful not to take pain medicine more often than the instructions allow. You could get worse or more frequent headaches when the medicine wears off. To prevent migraines  · Keep a headache diary so you can figure out what triggers your headaches. Avoiding triggers may help you prevent headaches. Record when each headache began, how long it lasted, and what the pain was like.  (Was it throbbing, aching, stabbing, or dull?) Write down any other symptoms you had with the chances of quitting for good. · If you are taking birth control pills or hormone therapy, talk to your doctor about whether they are triggering your migraines. When should you call for help? Call 911 anytime you think you may need emergency care. For example, call if:    · You have signs of a stroke. These may include:  ? Sudden numbness, paralysis, or weakness in your face, arm, or leg, especially on only one side of your body. ? Sudden vision changes. ? Sudden trouble speaking. ? Sudden confusion or trouble understanding simple statements. ? Sudden problems with walking or balance. ? A sudden, severe headache that is different from past headaches.    Call your doctor now or seek immediate medical care if:    · You have new or worse nausea and vomiting.     · You have a new or higher fever.     · Your headache gets much worse.    Watch closely for changes in your health, and be sure to contact your doctor if:    · You are not getting better after 2 days (48 hours). Where can you learn more? Go to https://AlwaySupport.TravelerCar. org and sign in to your [employer] account. Enter I689 in the [employer] box to learn more about \"Migraine Headache: Care Instructions. \"     If you do not have an account, please click on the \"Sign Up Now\" link. Current as of: Zoya 3, 2018  Content Version: 12.0  © 3762-3652 Healthwise, Incorporated. Care instructions adapted under license by [facility] If you have questions about a medical condition or this instruction, always ask your healthcare professional. [place] [address] any warranty or liability for your use of this information.

## 2019-06-26 ENCOUNTER — HOSPITAL ENCOUNTER (OUTPATIENT)
Age: 29
Discharge: HOME | End: 2019-06-26
Payer: OTHER GOVERNMENT

## 2019-06-26 DIAGNOSIS — E03.9 HYPOTHYROIDISM, UNSPECIFIED TYPE: ICD-10-CM

## 2019-06-26 LAB — TSH SERPL DL<=0.05 MIU/L-ACNC: 4.35 UIU/ML (ref 0.27–4.2)

## 2019-06-26 PROCEDURE — 84443 ASSAY THYROID STIM HORMONE: CPT

## 2019-06-26 PROCEDURE — 36415 COLL VENOUS BLD VENIPUNCTURE: CPT

## 2019-06-27 DIAGNOSIS — E03.9 HYPOTHYROIDISM, UNSPECIFIED TYPE: ICD-10-CM

## 2019-08-15 ENCOUNTER — OFFICE VISIT (OUTPATIENT)
Dept: FAMILY MEDICINE CLINIC | Age: 29
End: 2019-08-15
Payer: OTHER GOVERNMENT

## 2019-08-15 VITALS
SYSTOLIC BLOOD PRESSURE: 109 MMHG | HEIGHT: 70 IN | DIASTOLIC BLOOD PRESSURE: 68 MMHG | TEMPERATURE: 97.9 F | HEART RATE: 75 BPM | WEIGHT: 186 LBS | RESPIRATION RATE: 16 BRPM | BODY MASS INDEX: 26.63 KG/M2 | OXYGEN SATURATION: 99 %

## 2019-08-15 DIAGNOSIS — Z01.810 PREOP CARDIOVASCULAR EXAM: ICD-10-CM

## 2019-08-15 DIAGNOSIS — M25.562 ACUTE PAIN OF LEFT KNEE: Primary | ICD-10-CM

## 2019-08-15 PROCEDURE — 99213 OFFICE O/P EST LOW 20 MIN: CPT | Performed by: FAMILY MEDICINE

## 2019-08-15 RX ORDER — NAPROXEN 500 MG/1
500 TABLET ORAL 2 TIMES DAILY WITH MEALS
Qty: 28 TABLET | Refills: 0 | Status: SHIPPED
Start: 2019-08-15 | End: 2020-05-22

## 2019-08-15 RX ORDER — LEVOTHYROXINE SODIUM 0.12 MG/1
TABLET ORAL
COMMUNITY
Start: 2019-07-10 | End: 2019-10-24

## 2019-08-15 NOTE — PROGRESS NOTES
Progress Note    Subjective: Eleonora Cueto is a 34y.o. year old female here for knee pain. 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 No trauma, no injury. Some swelling on Monday. No popping/locking. No erythema. No previous h/o this. No other joints affected. Will be getting tubal ligation. Done w/ OB/GYN. Cardiac Risk Estimation: per the Revised Cardiac Risk Index (Circ. 100:1043, 1999), the patient's risk factors for cardiac complications include none, putting her in: RCI RISK CLASS I (0 risk factors, risk of major cardiac compl. appr. 0.5%)  METS > 4. Past Medical History:   Diagnosis Date    Anxiety     Headache     Hypothyroidism      Past Surgical History:   Procedure Laterality Date    CHOLECYSTECTOMY       Family History   Problem Relation Age of Onset    Arrhythmia Mother         SVT s/p ablation    Cancer Father         skin (2017) & lymphoma (2015)     Social History     Socioeconomic History    Marital status:      Spouse name: Not on file    Number of children: Not on file    Years of education: Not on file    Highest education level: Not on file   Occupational History    Not on file   Social Needs    Financial resource strain: Not on file    Food insecurity:     Worry: Not on file     Inability: Not on file    Transportation needs:     Medical: Not on file     Non-medical: Not on file   Tobacco Use    Smoking status: Former Smoker     Packs/day: 0.25     Years: 1.00     Pack years: 0.25     Types: Cigarettes     Last attempt to quit: 2018     Years since quittin.7    Smokeless tobacco: Never Used   Substance and Sexual Activity    Alcohol use: No    Drug use:  No

## 2019-08-22 ENCOUNTER — TELEPHONE (OUTPATIENT)
Dept: FAMILY MEDICINE CLINIC | Age: 29
End: 2019-08-22

## 2019-08-22 DIAGNOSIS — E03.9 HYPOTHYROIDISM, UNSPECIFIED TYPE: Primary | ICD-10-CM

## 2019-08-22 NOTE — TELEPHONE ENCOUNTER
Please have pt go to [facility] lab to get confirmatory testing. Thank you very much.

## 2019-08-23 ENCOUNTER — HOSPITAL ENCOUNTER (OUTPATIENT)
Age: 29
Discharge: HOME | End: 2019-08-23
Payer: OTHER GOVERNMENT

## 2019-08-23 DIAGNOSIS — E03.9 HYPOTHYROIDISM, UNSPECIFIED TYPE: ICD-10-CM

## 2019-08-23 LAB
T4 FREE: 2.33 NG/DL (ref 0.93–1.7)
TSH SERPL DL<=0.05 MIU/L-ACNC: 0.02 UIU/ML (ref 0.27–4.2)

## 2019-08-23 PROCEDURE — 36415 COLL VENOUS BLD VENIPUNCTURE: CPT

## 2019-08-23 PROCEDURE — 84443 ASSAY THYROID STIM HORMONE: CPT

## 2019-08-23 PROCEDURE — 84439 ASSAY OF FREE THYROXINE: CPT

## 2019-08-24 DIAGNOSIS — E03.9 HYPOTHYROIDISM, UNSPECIFIED TYPE: ICD-10-CM

## 2019-08-24 RX ORDER — LEVOTHYROXINE SODIUM 112 UG/1
112 TABLET ORAL DAILY
Qty: 90 TABLET | Refills: 0 | Status: SHIPPED | OUTPATIENT
Start: 2019-08-24 | End: 2019-10-24 | Stop reason: SDUPTHER

## 2019-10-23 DIAGNOSIS — E03.9 HYPOTHYROIDISM, UNSPECIFIED TYPE: Primary | ICD-10-CM

## 2019-10-24 ENCOUNTER — HOSPITAL ENCOUNTER (OUTPATIENT)
Age: 29
Discharge: HOME | End: 2019-10-24
Payer: OTHER GOVERNMENT

## 2019-10-24 DIAGNOSIS — E03.9 HYPOTHYROIDISM, UNSPECIFIED TYPE: ICD-10-CM

## 2019-10-24 LAB — TSH SERPL DL<=0.05 MIU/L-ACNC: 13.67 UIU/ML (ref 0.27–4.2)

## 2019-10-24 PROCEDURE — 84443 ASSAY THYROID STIM HORMONE: CPT

## 2019-10-24 PROCEDURE — 36415 COLL VENOUS BLD VENIPUNCTURE: CPT

## 2019-10-24 RX ORDER — LEVOTHYROXINE SODIUM 0.12 MG/1
125 TABLET ORAL DAILY
Qty: 90 TABLET | Refills: 0 | Status: SHIPPED
Start: 2019-10-24 | End: 2020-02-11 | Stop reason: SDUPTHER

## 2019-12-25 ENCOUNTER — HOSPITAL ENCOUNTER (EMERGENCY)
Age: 29
Discharge: HOME | End: 2019-12-25
Payer: OTHER GOVERNMENT

## 2019-12-25 ENCOUNTER — APPOINTMENT (OUTPATIENT)
Dept: GENERAL RADIOLOGY | Age: 29
End: 2019-12-25
Payer: OTHER GOVERNMENT

## 2019-12-25 VITALS
BODY MASS INDEX: 25.77 KG/M2 | RESPIRATION RATE: 16 BRPM | DIASTOLIC BLOOD PRESSURE: 77 MMHG | WEIGHT: 180 LBS | SYSTOLIC BLOOD PRESSURE: 138 MMHG | HEART RATE: 69 BPM | TEMPERATURE: 100 F | HEIGHT: 70 IN | OXYGEN SATURATION: 98 %

## 2019-12-25 DIAGNOSIS — J10.1 INFLUENZA B: Primary | ICD-10-CM

## 2019-12-25 LAB
INFLUENZA A BY PCR: NOT DETECTED
INFLUENZA B BY PCR: DETECTED

## 2019-12-25 PROCEDURE — 87502 INFLUENZA DNA AMP PROBE: CPT

## 2019-12-25 PROCEDURE — 6370000000 HC RX 637 (ALT 250 FOR IP): Performed by: PHYSICIAN ASSISTANT

## 2019-12-25 PROCEDURE — 71046 X-RAY EXAM CHEST 2 VIEWS: CPT

## 2019-12-25 PROCEDURE — 99283 EMERGENCY DEPT VISIT LOW MDM: CPT

## 2019-12-25 RX ORDER — ACETAMINOPHEN 500 MG
1000 TABLET ORAL 3 TIMES DAILY
Qty: 60 TABLET | Refills: 0 | Status: SHIPPED | OUTPATIENT
Start: 2019-12-25 | End: 2020-01-04

## 2019-12-25 RX ORDER — ACETAMINOPHEN 500 MG
1000 TABLET ORAL ONCE
Status: COMPLETED | OUTPATIENT
Start: 2019-12-25 | End: 2019-12-25

## 2019-12-25 RX ORDER — METOCLOPRAMIDE 10 MG/1
10 TABLET ORAL 4 TIMES DAILY PRN
Qty: 20 TABLET | Refills: 0 | Status: SHIPPED | OUTPATIENT
Start: 2019-12-25 | End: 2020-05-22

## 2019-12-25 RX ADMIN — ACETAMINOPHEN 1000 MG: 500 TABLET ORAL at 20:30

## 2019-12-25 ASSESSMENT — PAIN SCALES - GENERAL: PAINLEVEL_OUTOF10: 4

## 2020-01-06 ENCOUNTER — TELEPHONE (OUTPATIENT)
Dept: FAMILY MEDICINE CLINIC | Age: 30
End: 2020-01-06

## 2020-02-04 ENCOUNTER — HOSPITAL ENCOUNTER (OUTPATIENT)
Age: 30
Discharge: HOME | End: 2020-02-04
Payer: OTHER GOVERNMENT

## 2020-02-04 LAB
T4 FREE: 1.86 NG/DL (ref 0.93–1.7)
TSH SERPL DL<=0.05 MIU/L-ACNC: 0.51 UIU/ML (ref 0.27–4.2)

## 2020-02-04 PROCEDURE — 84439 ASSAY OF FREE THYROXINE: CPT

## 2020-02-04 PROCEDURE — 36415 COLL VENOUS BLD VENIPUNCTURE: CPT

## 2020-02-04 PROCEDURE — 84443 ASSAY THYROID STIM HORMONE: CPT

## 2020-02-13 RX ORDER — LEVOTHYROXINE SODIUM 0.12 MG/1
125 TABLET ORAL DAILY
Qty: 90 TABLET | Refills: 0 | Status: SHIPPED
Start: 2020-02-13 | End: 2020-05-15

## 2020-04-03 ENCOUNTER — HOSPITAL ENCOUNTER (EMERGENCY)
Age: 30
Discharge: HOME | End: 2020-04-03
Attending: EMERGENCY MEDICINE
Payer: OTHER GOVERNMENT

## 2020-04-03 ENCOUNTER — APPOINTMENT (OUTPATIENT)
Dept: CT IMAGING | Age: 30
End: 2020-04-03
Payer: OTHER GOVERNMENT

## 2020-04-03 VITALS
TEMPERATURE: 98.2 F | RESPIRATION RATE: 14 BRPM | SYSTOLIC BLOOD PRESSURE: 113 MMHG | OXYGEN SATURATION: 97 % | HEART RATE: 64 BPM | BODY MASS INDEX: 24.34 KG/M2 | WEIGHT: 170 LBS | DIASTOLIC BLOOD PRESSURE: 63 MMHG | HEIGHT: 70 IN

## 2020-04-03 LAB
ALBUMIN SERPL-MCNC: 4.8 G/DL (ref 3.5–5.2)
ALP BLD-CCNC: 97 U/L (ref 35–104)
ALT SERPL-CCNC: 19 U/L (ref 0–32)
ANION GAP SERPL CALCULATED.3IONS-SCNC: 12 MMOL/L (ref 7–16)
AST SERPL-CCNC: 17 U/L (ref 0–31)
BACTERIA: ABNORMAL /HPF
BASOPHILS ABSOLUTE: 0.05 E9/L (ref 0–0.2)
BASOPHILS RELATIVE PERCENT: 0.5 % (ref 0–2)
BILIRUB SERPL-MCNC: 0.2 MG/DL (ref 0–1.2)
BILIRUBIN URINE: NEGATIVE
BLOOD, URINE: NEGATIVE
BUN BLDV-MCNC: 13 MG/DL (ref 6–20)
CALCIUM SERPL-MCNC: 9.8 MG/DL (ref 8.6–10.2)
CHLORIDE BLD-SCNC: 100 MMOL/L (ref 98–107)
CLARITY: CLEAR
CO2: 27 MMOL/L (ref 22–29)
COLOR: YELLOW
CREAT SERPL-MCNC: 0.8 MG/DL (ref 0.5–1)
EOSINOPHILS ABSOLUTE: 0.16 E9/L (ref 0.05–0.5)
EOSINOPHILS RELATIVE PERCENT: 1.7 % (ref 0–6)
EPITHELIAL CELLS, UA: ABNORMAL /HPF
GFR AFRICAN AMERICAN: >60
GFR NON-AFRICAN AMERICAN: >60 ML/MIN/1.73
GLUCOSE BLD-MCNC: 108 MG/DL (ref 74–99)
GLUCOSE URINE: NEGATIVE MG/DL
HCG, URINE, POC: NEGATIVE
HCT VFR BLD CALC: 38.7 % (ref 34–48)
HEMOGLOBIN: 12.9 G/DL (ref 11.5–15.5)
IMMATURE GRANULOCYTES #: 0.03 E9/L
IMMATURE GRANULOCYTES %: 0.3 % (ref 0–5)
KETONES, URINE: ABNORMAL MG/DL
LEUKOCYTE ESTERASE, URINE: ABNORMAL
LYMPHOCYTES ABSOLUTE: 2.44 E9/L (ref 1.5–4)
LYMPHOCYTES RELATIVE PERCENT: 25.7 % (ref 20–42)
Lab: NORMAL
MCH RBC QN AUTO: 28.6 PG (ref 26–35)
MCHC RBC AUTO-ENTMCNC: 33.3 % (ref 32–34.5)
MCV RBC AUTO: 85.8 FL (ref 80–99.9)
MONOCYTES ABSOLUTE: 0.51 E9/L (ref 0.1–0.95)
MONOCYTES RELATIVE PERCENT: 5.4 % (ref 2–12)
NEGATIVE QC PASS/FAIL: NORMAL
NEUTROPHILS ABSOLUTE: 6.31 E9/L (ref 1.8–7.3)
NEUTROPHILS RELATIVE PERCENT: 66.4 % (ref 43–80)
NITRITE, URINE: NEGATIVE
PDW BLD-RTO: 13.1 FL (ref 11.5–15)
PH UA: 7 (ref 5–9)
PLATELET # BLD: 252 E9/L (ref 130–450)
PMV BLD AUTO: 9.8 FL (ref 7–12)
POSITIVE QC PASS/FAIL: NORMAL
POTASSIUM REFLEX MAGNESIUM: 4.5 MMOL/L (ref 3.5–5)
PROTEIN UA: NEGATIVE MG/DL
RBC # BLD: 4.51 E12/L (ref 3.5–5.5)
RBC UA: ABNORMAL /HPF (ref 0–2)
SODIUM BLD-SCNC: 139 MMOL/L (ref 132–146)
SPECIFIC GRAVITY UA: 1.02 (ref 1–1.03)
TOTAL PROTEIN: 7.5 G/DL (ref 6.4–8.3)
UROBILINOGEN, URINE: 0.2 E.U./DL
WBC # BLD: 9.5 E9/L (ref 4.5–11.5)
WBC UA: ABNORMAL /HPF (ref 0–5)

## 2020-04-03 PROCEDURE — 6360000002 HC RX W HCPCS: Performed by: STUDENT IN AN ORGANIZED HEALTH CARE EDUCATION/TRAINING PROGRAM

## 2020-04-03 PROCEDURE — 80053 COMPREHEN METABOLIC PANEL: CPT

## 2020-04-03 PROCEDURE — 2580000003 HC RX 258: Performed by: STUDENT IN AN ORGANIZED HEALTH CARE EDUCATION/TRAINING PROGRAM

## 2020-04-03 PROCEDURE — 85025 COMPLETE CBC W/AUTO DIFF WBC: CPT

## 2020-04-03 PROCEDURE — 81001 URINALYSIS AUTO W/SCOPE: CPT

## 2020-04-03 PROCEDURE — 74176 CT ABD & PELVIS W/O CONTRAST: CPT

## 2020-04-03 PROCEDURE — 96374 THER/PROPH/DIAG INJ IV PUSH: CPT

## 2020-04-03 PROCEDURE — 99284 EMERGENCY DEPT VISIT MOD MDM: CPT

## 2020-04-03 PROCEDURE — 96375 TX/PRO/DX INJ NEW DRUG ADDON: CPT

## 2020-04-03 RX ORDER — KETOROLAC TROMETHAMINE 10 MG/1
10 TABLET, FILM COATED ORAL EVERY 6 HOURS PRN
Qty: 20 TABLET | Refills: 0 | Status: SHIPPED | OUTPATIENT
Start: 2020-04-03 | End: 2020-05-22

## 2020-04-03 RX ORDER — ONDANSETRON 4 MG/1
4 TABLET, ORALLY DISINTEGRATING ORAL EVERY 8 HOURS PRN
Qty: 10 TABLET | Refills: 0 | Status: SHIPPED | OUTPATIENT
Start: 2020-04-03 | End: 2020-05-22

## 2020-04-03 RX ORDER — KETOROLAC TROMETHAMINE 30 MG/ML
15 INJECTION, SOLUTION INTRAMUSCULAR; INTRAVENOUS ONCE
Status: COMPLETED | OUTPATIENT
Start: 2020-04-03 | End: 2020-04-03

## 2020-04-03 RX ORDER — ONDANSETRON 2 MG/ML
4 INJECTION INTRAMUSCULAR; INTRAVENOUS ONCE
Status: COMPLETED | OUTPATIENT
Start: 2020-04-03 | End: 2020-04-03

## 2020-04-03 RX ORDER — 0.9 % SODIUM CHLORIDE 0.9 %
1000 INTRAVENOUS SOLUTION INTRAVENOUS ONCE
Status: COMPLETED | OUTPATIENT
Start: 2020-04-03 | End: 2020-04-03

## 2020-04-03 RX ADMIN — SODIUM CHLORIDE 1000 ML: 9 INJECTION, SOLUTION INTRAVENOUS at 19:28

## 2020-04-03 RX ADMIN — ONDANSETRON 4 MG: 2 INJECTION INTRAMUSCULAR; INTRAVENOUS at 19:28

## 2020-04-03 RX ADMIN — KETOROLAC TROMETHAMINE 15 MG: 30 INJECTION, SOLUTION INTRAMUSCULAR at 19:28

## 2020-04-03 ASSESSMENT — ENCOUNTER SYMPTOMS
PHOTOPHOBIA: 0
ABDOMINAL DISTENTION: 0
DIARRHEA: 0
ABDOMINAL PAIN: 1
CHEST TIGHTNESS: 0
SHORTNESS OF BREATH: 0
VOMITING: 0
NAUSEA: 0
CONSTIPATION: 0

## 2020-04-03 ASSESSMENT — PAIN SCALES - GENERAL
PAINLEVEL_OUTOF10: 9
PAINLEVEL_OUTOF10: 9

## 2020-04-03 ASSESSMENT — PAIN DESCRIPTION - LOCATION: LOCATION: ABDOMEN

## 2020-04-03 ASSESSMENT — PAIN DESCRIPTION - PAIN TYPE: TYPE: ACUTE PAIN

## 2020-04-03 NOTE — ED PROVIDER NOTES
flank pain. Negative for difficulty urinating and dysuria. Musculoskeletal: Negative for neck pain and neck stiffness. Skin: Negative for pallor and rash. Allergic/Immunologic: Negative for immunocompromised state. Neurological: Negative for dizziness and headaches. Psychiatric/Behavioral: Negative for confusion. Physical Exam  Vitals signs and nursing note reviewed. Constitutional:       Appearance: Normal appearance. HENT:      Head: Normocephalic and atraumatic. Eyes:      General: No scleral icterus. Conjunctiva/sclera: Conjunctivae normal.      Pupils: Pupils are equal, round, and reactive to light. Neck:      Musculoskeletal: Normal range of motion and neck supple. No neck rigidity or muscular tenderness. Cardiovascular:      Rate and Rhythm: Normal rate and regular rhythm. Pulmonary:      Effort: Pulmonary effort is normal. No respiratory distress. Breath sounds: Normal breath sounds. No wheezing, rhonchi or rales. Abdominal:      General: Bowel sounds are normal. There is no distension. Palpations: Abdomen is soft. Tenderness: There is abdominal tenderness (right flank tenderness,). There is no guarding or rebound. Comments: Mild paraspinal tenderness right sided   Musculoskeletal:      Right lower leg: No edema. Left lower leg: No edema. Skin:     General: Skin is warm. Capillary Refill: Capillary refill takes less than 2 seconds. Coloration: Skin is not pale. Findings: No erythema or rash. Neurological:      General: No focal deficit present. Mental Status: She is alert and oriented to person, place, and time. Psychiatric:         Mood and Affect: Mood normal.          Procedures     MDM  Number of Diagnoses or Management Options  Mesenteric adenitis:   Nausea:   Diagnosis management comments: Elliot Chew is a 34year old female who presented to ED with right sided flank pain and RLQ abdominal pain with nausea. Final Result   No acute inflammation or bowel obstruction. Normal appendix. Moderate mesenteric adenopathy probably reactive or due to mesenteric   adenitis.                ------------------------- NURSING NOTES AND VITALS REVIEWED ---------------------------  Date / Time Roomed:  4/3/2020  6:07 PM  ED Bed Assignment:  22/22    The nursing notes within the ED encounter and vital signs as below have been reviewed. /63   Pulse 64   Temp 98.2 °F (36.8 °C) (Oral)   Resp 14   Ht 5' 10\" (1.778 m)   Wt 170 lb (77.1 kg)   SpO2 97%   BMI 24.39 kg/m²   Oxygen Saturation Interpretation: Normal      ------------------------------------------ PROGRESS NOTES ------------------------------------------  7:44 PM EDT  I have spoken with the patient and discussed todays results, in addition to providing specific details for the plan of care and counseling regarding the diagnosis and prognosis. Their questions are answered at this time and they are agreeable with the plan. I discussed at length with them reasons for immediate return here for re evaluation. They will followup with their primary care physician by calling their office tomorrow. --------------------------------- ADDITIONAL PROVIDER NOTES ---------------------------------  At this time the patient is without objective evidence of an acute process requiring hospitalization or inpatient management. They have remained hemodynamically stable throughout their entire ED visit and are stable for discharge with outpatient follow-up. The plan has been discussed in detail and they are aware of the specific conditions for emergent return, as well as the importance of follow-up.       Discharge Medication List as of 4/3/2020  7:41 PM      START taking these medications    Details   ondansetron (ZOFRAN ODT) 4 MG disintegrating tablet Take 1 tablet by mouth every 8 hours as needed for Nausea or Vomiting, Disp-10 tablet, R-0Print      ketorolac (TORADOL) 10 MG tablet Take 1 tablet by mouth every 6 hours as needed for Pain, Disp-20 tablet, R-0Print             Diagnosis:  1. Mesenteric adenitis    2. Nausea    3. Abdominal pain, right lower quadrant        Disposition:  Patient's disposition: Discharge to home  Patient's condition is stable. Swapna Wyatt MD  04/03/20 2048    ATTENDING PROVIDER ATTESTATION:     I have personally performed and/or participated in the history, exam, medical decision making, and procedures and agree with all pertinent clinical information unless otherwise noted. I have also reviewed and agree with the past medical, family and social history unless otherwise noted. I have discussed this patient in detail with the resident and provided the instruction and education regarding the evidence-based evaluation and treatment of Flank Pain (right sided wraps around to the back, x2 days); Abdominal Pain; and Nausea      My plan: Symptomatic and supportive care. Patient seen and examined labs and imaging were ordered. Patient has some mild right lower quadrant tenderness. CT scan of the abdomen and labs are ordered. Imaging reveals signs concerning for mesenteric adenitis. Patient's vitals remained stable she was felt safe for discharge with anti-inflammatory medication after improvement of symptoms on reassessment. Patient given strict return precautions felt comfortable discharge at this time.     Electronically signed by Griffin Massey DO on 4/3/20 at 8:57 PM EDT               Griffin Massey DO  04/03/20 2057

## 2020-04-04 ENCOUNTER — CARE COORDINATION (OUTPATIENT)
Dept: CARE COORDINATION | Age: 30
End: 2020-04-04

## 2020-04-05 ENCOUNTER — CARE COORDINATION (OUTPATIENT)
Dept: CARE COORDINATION | Age: 30
End: 2020-04-05

## 2020-04-05 NOTE — CARE COORDINATION
[facility]'s second attempt to contact patient was unsuccessful. ACM left a voice message with call back information asking for a return call.      PLAN  Continue to attempt to contact patient

## 2020-04-18 ENCOUNTER — CARE COORDINATION (OUTPATIENT)
Dept: CARE COORDINATION | Age: 30
End: 2020-04-18

## 2020-04-18 NOTE — CARE COORDINATION
[facility]'s final attempt to contact patient was unsuccessful. ACM left a voice message with call back information asking for a return call.      PLAN  D/T ACM was unable to reach patient after multiple attempts, COVID 19 Monitoring episode will be closed and M will remove name from care team.

## 2020-05-06 ENCOUNTER — HOSPITAL ENCOUNTER (OUTPATIENT)
Age: 30
Discharge: HOME | End: 2020-05-06
Payer: OTHER GOVERNMENT

## 2020-05-06 LAB
T4 FREE: 1.55 NG/DL (ref 0.93–1.7)
TSH SERPL DL<=0.05 MIU/L-ACNC: 2.28 UIU/ML (ref 0.27–4.2)

## 2020-05-06 PROCEDURE — 84443 ASSAY THYROID STIM HORMONE: CPT

## 2020-05-06 PROCEDURE — 84439 ASSAY OF FREE THYROXINE: CPT

## 2020-05-06 PROCEDURE — 36415 COLL VENOUS BLD VENIPUNCTURE: CPT

## 2020-05-22 ENCOUNTER — VIRTUAL VISIT (OUTPATIENT)
Dept: FAMILY MEDICINE CLINIC | Age: 30
End: 2020-05-22
Payer: OTHER GOVERNMENT

## 2020-05-22 PROCEDURE — 99214 OFFICE O/P EST MOD 30 MIN: CPT | Performed by: FAMILY MEDICINE

## 2020-05-22 RX ORDER — CEFUROXIME AXETIL 250 MG/1
6 TABLET ORAL 2 TIMES DAILY PRN
Qty: 4 ML | Refills: 0 | Status: SHIPPED | OUTPATIENT
Start: 2020-05-22

## 2020-05-22 RX ORDER — LEVOTHYROXINE SODIUM 0.12 MG/1
TABLET ORAL
Qty: 180 TABLET | Refills: 0 | Status: SHIPPED
Start: 2020-05-22 | End: 2020-08-24 | Stop reason: SDUPTHER

## 2020-05-22 ASSESSMENT — PATIENT HEALTH QUESTIONNAIRE - PHQ9
SUM OF ALL RESPONSES TO PHQ QUESTIONS 1-9: 0
2. FEELING DOWN, DEPRESSED OR HOPELESS: 0
1. LITTLE INTEREST OR PLEASURE IN DOING THINGS: 0
SUM OF ALL RESPONSES TO PHQ9 QUESTIONS 1 & 2: 0
SUM OF ALL RESPONSES TO PHQ QUESTIONS 1-9: 0

## 2020-05-22 NOTE — PROGRESS NOTES
violence     Fear of current or ex partner: Not on file     Emotionally abused: Not on file     Physically abused: Not on file     Forced sexual activity: Not on file   Other Topics Concern    Not on file   Social History Narrative    Not on file       Review Of Systems (unless otherwise specified)  Gen: No fevers, sweats, chills   No fatigue   No weight unintentional weight changes  Skin:  No rash, no lesion  Resp: No cough, shortness of breath, wheeze, chest congestion  CV: No chest pain, palpitation, edema   GI:  No abdominal pain   No nausea, no vomiting   No change in bowels, no diarrhea or constipation   No blood in stool or blood per rectum  Neuro: No headaches   No syncope/near syncope   No dizziness  MS: No back pain   No arthralgias   No myalgias   No gait issues            Objective: There were no vitals taken for this visit. General appearance: NAD, alert and interacting appropriately  Neuro: CNs intact. Resp: normal WOB. No cough during visit. Psych: normal affect. Normal eye contact. Normal thought content. Skin: no visible rashes or lesions. I have reviewed this patient's previous records. I have reviewed this patient's labs. I have reviewed this patient's medications. Assessment/Plan:    Jose Jacob was seen today for discuss medications. Diagnoses and all orders for this visit:    Other migraine without status migrainosus, not intractable  -     SUMAtriptan Succinate ([address]) 6 MG/0.5ML SOAJ; Inject 6 mg into the skin 2 times daily as needed (migraine)    Hypothyroidism, unspecified type  -     levothyroxine (SYNTHROID) 125 MCG tablet; TAKE ONE TABLET BY MOUTH DAILY    Acute pain of left knee      Migraines not controlled. Knee pain controlled. Continue Aleve as needed. Hypothyroidism controlled. Return in about 1 month (around 6/22/2020) for Migraines.         Electronically signed by Gianni Velazco MD on 5/22/2020 at 9:55 AM

## 2020-08-25 RX ORDER — LEVOTHYROXINE SODIUM 0.12 MG/1
TABLET ORAL
Qty: 180 TABLET | Refills: 0 | Status: SHIPPED | OUTPATIENT
Start: 2020-08-25

## 2020-12-02 ENCOUNTER — APPOINTMENT (OUTPATIENT)
Age: 30
Setting detail: DERMATOLOGY
End: 2020-12-03

## 2020-12-02 DIAGNOSIS — D18.0 HEMANGIOMA: ICD-10-CM

## 2020-12-02 DIAGNOSIS — Z71.89 OTHER SPECIFIED COUNSELING: ICD-10-CM

## 2020-12-02 DIAGNOSIS — D485 NEOPLASM OF UNCERTAIN BEHAVIOR OF SKIN: ICD-10-CM

## 2020-12-02 DIAGNOSIS — D22 MELANOCYTIC NEVI: ICD-10-CM

## 2020-12-02 DIAGNOSIS — Z80.8 FAMILY HISTORY OF MALIGNANT NEOPLASM OF OTHER ORGANS OR SYSTEMS: ICD-10-CM

## 2020-12-02 PROBLEM — D48.5 NEOPLASM OF UNCERTAIN BEHAVIOR OF SKIN: Status: ACTIVE | Noted: 2020-12-02

## 2020-12-02 PROBLEM — D18.01 HEMANGIOMA OF SKIN AND SUBCUTANEOUS TISSUE: Status: ACTIVE | Noted: 2020-12-02

## 2020-12-02 PROBLEM — D22.72 MELANOCYTIC NEVI OF LEFT LOWER LIMB, INCLUDING HIP: Status: ACTIVE | Noted: 2020-12-02

## 2020-12-02 PROCEDURE — OTHER OBSERVATION: OTHER

## 2020-12-02 PROCEDURE — 11103 TANGNTL BX SKIN EA SEP/ADDL: CPT

## 2020-12-02 PROCEDURE — OTHER SUNSCREEN RECOMMENDATIONS: OTHER

## 2020-12-02 PROCEDURE — OTHER BIOPSY BY SHAVE METHOD: OTHER

## 2020-12-02 PROCEDURE — 11102 TANGNTL BX SKIN SINGLE LES: CPT

## 2020-12-02 PROCEDURE — OTHER REASSURANCE: OTHER

## 2020-12-02 PROCEDURE — OTHER MIPS QUALITY: OTHER

## 2020-12-02 PROCEDURE — OTHER COUNSELING: OTHER

## 2020-12-02 PROCEDURE — 99203 OFFICE O/P NEW LOW 30 MIN: CPT | Mod: 25

## 2020-12-02 ASSESSMENT — LOCATION DETAILED DESCRIPTION DERM
LOCATION DETAILED: LEFT INFERIOR MEDIAL LOWER BACK
LOCATION DETAILED: LEFT INFERIOR MEDIAL LOWER BACK
LOCATION DETAILED: RIGHT SUPERIOR LATERAL LOWER BACK
LOCATION DETAILED: LEFT ANTERIOR PROXIMAL THIGH
LOCATION DETAILED: RIGHT INFERIOR MEDIAL LOWER BACK

## 2020-12-02 ASSESSMENT — LOCATION ZONE DERM
LOCATION ZONE: TRUNK
LOCATION ZONE: LEG
LOCATION ZONE: TRUNK

## 2020-12-02 ASSESSMENT — LOCATION SIMPLE DESCRIPTION DERM
LOCATION SIMPLE: LEFT LOWER BACK
LOCATION SIMPLE: RIGHT LOWER BACK
LOCATION SIMPLE: LEFT LOWER BACK
LOCATION SIMPLE: LEFT THIGH

## 2020-12-02 NOTE — PROCEDURE: MIPS QUALITY
Name And Contact Information For Health Care Proxy: Paulo Aguirre spouse [phone] Name And Contact Information For Health Care Proxy: Paulo Aguirre spouse [phone]

## 2020-12-02 NOTE — PROCEDURE: BIOPSY BY SHAVE METHOD
Depth Of Biopsy: dermis
Hide Accession Number?: No
Anesthesia Volume In Cc (Will Not Render If 0): 0.5
Detail Level: Detailed
Electrodesiccation Text: The wound bed was treated with electrodesiccation after the biopsy was performed.
Notification Instructions: Patient will be notified of biopsy results. However, patient instructed to call the office if not contacted within 2 weeks.
Dressing: bandage
Biopsy Method: Dermablade
Billing Type: Third-Party Bill
Post-Care Instructions: I reviewed with the patient in detail post-care instructions. Patient is to keep the biopsy site dry overnight, and then apply bacitracin twice daily until healed. Patient may apply hydrogen peroxide soaks to remove any crusting.
Render Post-Care Instructions In Note?: yes
Curettage Text: The wound bed was treated with curettage after the biopsy was performed.
Type Of Destruction Used: Curettage
Silver Nitrate Text: The wound bed was treated with silver nitrate after the biopsy was performed.
Additional Anesthesia Volume In Cc (Will Not Render If 0): 0
Hemostasis: Drysol
Biopsy Type: H and E
Cryotherapy Text: The wound bed was treated with cryotherapy after the biopsy was performed.
Anesthesia Type: 1% lidocaine with epinephrine
Electrodesiccation And Curettage Text: The wound bed was treated with electrodesiccation and curettage after the biopsy was performed.
Information: Selecting Yes will display possible errors in your note based on the variables you have selected. This validation is only offered as a suggestion for you. PLEASE NOTE THAT THE VALIDATION TEXT WILL BE REMOVED WHEN YOU FINALIZE YOUR NOTE. IF YOU WANT TO FAX A PRELIMINARY NOTE YOU WILL NEED TO TOGGLE THIS TO 'NO' IF YOU DO NOT WANT IT IN YOUR FAXED NOTE.
Wound Care: Petrolatum
Consent: Written consent was obtained and risks were reviewed including but not limited to scarring, infection, bleeding, scabbing, incomplete removal, nerve damage and allergy to anesthesia.

## 2020-12-02 NOTE — HPI: SKIN LESION
What Type Of Note Output Would You Prefer (Optional)?: Standard Output
How Severe Is Your Skin Lesion?: mild
Has Your Skin Lesion Been Treated?: not been treated
Is This A New Presentation, Or A Follow-Up?: Mole
Which Family Member (Optional)?: Dad

## 2021-04-12 ENCOUNTER — APPOINTMENT (OUTPATIENT)
Age: 31
Setting detail: DERMATOLOGY
End: 2021-04-13

## 2021-04-12 DIAGNOSIS — L21.8 OTHER SEBORRHEIC DERMATITIS: ICD-10-CM

## 2021-04-12 DIAGNOSIS — Z80.8 FAMILY HISTORY OF MALIGNANT NEOPLASM OF OTHER ORGANS OR SYSTEMS: ICD-10-CM

## 2021-04-12 DIAGNOSIS — Z71.89 OTHER SPECIFIED COUNSELING: ICD-10-CM

## 2021-04-12 DIAGNOSIS — L71.8 OTHER ROSACEA: ICD-10-CM

## 2021-04-12 DIAGNOSIS — Z87.2 PERSONAL HISTORY OF DISEASES OF THE SKIN AND SUBCUTANEOUS TISSUE: ICD-10-CM

## 2021-04-12 DIAGNOSIS — D22 MELANOCYTIC NEVI: ICD-10-CM

## 2021-04-12 PROBLEM — D22.72 MELANOCYTIC NEVI OF LEFT LOWER LIMB, INCLUDING HIP: Status: ACTIVE | Noted: 2021-04-12

## 2021-04-12 PROCEDURE — OTHER TREATMENT REGIMEN: OTHER

## 2021-04-12 PROCEDURE — OTHER SUNSCREEN RECOMMENDATIONS: OTHER

## 2021-04-12 PROCEDURE — 99213 OFFICE O/P EST LOW 20 MIN: CPT

## 2021-04-12 PROCEDURE — OTHER MIPS QUALITY: OTHER

## 2021-04-12 PROCEDURE — OTHER PRESCRIPTION: OTHER

## 2021-04-12 PROCEDURE — OTHER COUNSELING: OTHER

## 2021-04-12 PROCEDURE — OTHER OBSERVATION: OTHER

## 2021-04-12 RX ORDER — KETOCONAZOLE 20 MG/ML
SHAMPOO, SUSPENSION TOPICAL BIW
Qty: 1 | Refills: 4 | Status: ERX | COMMUNITY
Start: 2021-04-12

## 2021-04-12 ASSESSMENT — LOCATION SIMPLE DESCRIPTION DERM
LOCATION SIMPLE: UPPER BACK
LOCATION SIMPLE: RIGHT LOWER BACK
LOCATION SIMPLE: LOWER BACK
LOCATION SIMPLE: LEFT CHEEK
LOCATION SIMPLE: LEFT THIGH
LOCATION SIMPLE: RIGHT CHEEK

## 2021-04-12 ASSESSMENT — LOCATION ZONE DERM
LOCATION ZONE: FACE
LOCATION ZONE: TRUNK
LOCATION ZONE: LEG

## 2021-04-12 ASSESSMENT — LOCATION DETAILED DESCRIPTION DERM
LOCATION DETAILED: LEFT ANTERIOR PROXIMAL THIGH
LOCATION DETAILED: RIGHT INFERIOR LATERAL MIDBACK
LOCATION DETAILED: LEFT MEDIAL MALAR CHEEK
LOCATION DETAILED: INFERIOR LUMBAR SPINE
LOCATION DETAILED: SUPERIOR THORACIC SPINE
LOCATION DETAILED: RIGHT MEDIAL MALAR CHEEK

## 2021-04-12 ASSESSMENT — SEVERITY ASSESSMENT OVERALL AMONG ALL PATIENTS: IN YOUR EXPERIENCE, AMONG ALL PATIENTS YOU HAVE SEEN WITH THIS CONDITION, HOW SEVERE IS THIS PATIENT'S CONDITION?: MILD

## 2021-04-12 ASSESSMENT — SEVERITY ASSESSMENT: HOW SEVERE IS THIS PATIENT'S CONDITION?: MODERATE

## 2021-04-12 NOTE — PROCEDURE: TREATMENT REGIMEN
Detail Level: Zone
Initiate Treatment: Ketoconazole shampoo TIW, then 1-2 times a week as maintenance

## 2021-04-12 NOTE — PROCEDURE: MIPS QUALITY
Name And Contact Information For Health Care Proxy: Paulo Aguirre spouse [phone] Name And Contact Information For Health Care Proxy: Paulo Aguirre spouse [phone]

## 2021-04-12 NOTE — HPI: PREVENTATIVE SKIN CHECK
What Is The Reason For Today's Visit?: Full Body Skin Examination
Additional History: Patient with family hx of MM (to dad), hx of mild DN, and sun exposure presents today to recheck biopsy sites and TBE.

## 2021-04-12 NOTE — PROCEDURE: COUNSELING
Detail Level: Detailed
Normal rate, regular rhythm, normal S1, S2 heart sounds heard.
Detail Level: Generalized

## 2022-04-13 ENCOUNTER — APPOINTMENT (OUTPATIENT)
Dept: URBAN - METROPOLITAN AREA CLINIC 279 | Age: 32
Setting detail: DERMATOLOGY
End: 2022-04-13

## 2022-04-13 DIAGNOSIS — L90.6 STRIAE ATROPHICAE: ICD-10-CM

## 2022-04-13 DIAGNOSIS — L81.3 CAFÉ AU LAIT SPOTS: ICD-10-CM

## 2022-04-13 DIAGNOSIS — L81.4 OTHER MELANIN HYPERPIGMENTATION: ICD-10-CM

## 2022-04-13 DIAGNOSIS — D18.0 HEMANGIOMA: ICD-10-CM

## 2022-04-13 DIAGNOSIS — Z71.89 OTHER SPECIFIED COUNSELING: ICD-10-CM

## 2022-04-13 DIAGNOSIS — Z87.2 PERSONAL HISTORY OF DISEASES OF THE SKIN AND SUBCUTANEOUS TISSUE: ICD-10-CM

## 2022-04-13 DIAGNOSIS — D22 MELANOCYTIC NEVI: ICD-10-CM

## 2022-04-13 DIAGNOSIS — Z80.8 FAMILY HISTORY OF MALIGNANT NEOPLASM OF OTHER ORGANS OR SYSTEMS: ICD-10-CM

## 2022-04-13 PROBLEM — D18.01 HEMANGIOMA OF SKIN AND SUBCUTANEOUS TISSUE: Status: ACTIVE | Noted: 2022-04-13

## 2022-04-13 PROBLEM — D22.62 MELANOCYTIC NEVI OF LEFT UPPER LIMB, INCLUDING SHOULDER: Status: ACTIVE | Noted: 2022-04-13

## 2022-04-13 PROBLEM — D22.72 MELANOCYTIC NEVI OF LEFT LOWER LIMB, INCLUDING HIP: Status: ACTIVE | Noted: 2022-04-13

## 2022-04-13 PROBLEM — D22.5 MELANOCYTIC NEVI OF TRUNK: Status: ACTIVE | Noted: 2022-04-13

## 2022-04-13 PROCEDURE — 99213 OFFICE O/P EST LOW 20 MIN: CPT

## 2022-04-13 PROCEDURE — OTHER REASSURANCE: OTHER

## 2022-04-13 PROCEDURE — OTHER MIPS QUALITY: OTHER

## 2022-04-13 PROCEDURE — OTHER SUNSCREEN RECOMMENDATIONS: OTHER

## 2022-04-13 PROCEDURE — OTHER COUNSELING: OTHER

## 2022-04-13 ASSESSMENT — LOCATION DETAILED DESCRIPTION DERM
LOCATION DETAILED: LEFT DISTAL POSTERIOR THIGH
LOCATION DETAILED: EPIGASTRIC SKIN
LOCATION DETAILED: RIGHT SUPERIOR MEDIAL UPPER BACK
LOCATION DETAILED: LEFT LATERAL ABDOMEN
LOCATION DETAILED: LEFT MID-UPPER BACK
LOCATION DETAILED: SUPERIOR LUMBAR SPINE
LOCATION DETAILED: RIGHT INFERIOR UPPER BACK
LOCATION DETAILED: LEFT ANTERIOR SHOULDER
LOCATION DETAILED: LEFT ANTERIOR PROXIMAL THIGH
LOCATION DETAILED: MONS PUBIS

## 2022-04-13 ASSESSMENT — LOCATION ZONE DERM
LOCATION ZONE: TRUNK
LOCATION ZONE: VULVA
LOCATION ZONE: LEG
LOCATION ZONE: ARM

## 2022-04-13 ASSESSMENT — LOCATION SIMPLE DESCRIPTION DERM
LOCATION SIMPLE: LEFT POSTERIOR THIGH
LOCATION SIMPLE: LEFT THIGH
LOCATION SIMPLE: GROIN
LOCATION SIMPLE: LEFT SHOULDER
LOCATION SIMPLE: ABDOMEN
LOCATION SIMPLE: LEFT UPPER BACK
LOCATION SIMPLE: RIGHT UPPER BACK
LOCATION SIMPLE: LOWER BACK

## 2022-04-13 NOTE — PROCEDURE: SUNSCREEN RECOMMENDATIONS
Detail Level: Generalized
Products Recommended: Kristy clinton
General Sunscreen Counseling: I recommended a broad spectrum sunscreen with a SPF of 30 or higher.  I explained that SPF 30 sunscreens block approximately 97 percent of the sun's harmful rays.  Sunscreens should be applied at least 15 minutes prior to expected sun exposure and then every 2 hours after that as long as sun exposure continues. If swimming or exercising sunscreen should be reapplied every 45 minutes to an hour after getting wet or sweating.  One ounce, or the equivalent of a shot glass full of sunscreen, is adequate to protect the skin not covered by a bathing suit. I also recommended a lip balm with a sunscreen as well. Sun protective clothing can be used in lieu of sunscreen but must be worn the entire time you are exposed to the sun's rays.

## 2022-04-13 NOTE — PROCEDURE: COUNSELING
Detail Level: Detailed
Patient Specific Counseling (Will Not Stick From Patient To Patient): Rosalee
Sunscreen Recommendations: Broad spectrum suncreen daily-reapply often
Detail Level: Generalized

## 2022-04-13 NOTE — PROCEDURE: MIPS QUALITY
Quality 110: Preventive Care And Screening: Influenza Immunization: Influenza Immunization previously received during influenza season
Quality 226: Preventive Care And Screening: Tobacco Use: Screening And Cessation Intervention: Patient screened for tobacco use and is an ex/non-smoker
Quality 431: Preventive Care And Screening: Unhealthy Alcohol Use - Screening: Patient not identified as an unhealthy alcohol user when screened for unhealthy alcohol use using a systematic screening method
Quality 130: Documentation Of Current Medications In The Medical Record: Current Medications Documented
Detail Level: Detailed
Quality 265: Biopsy Follow-Up: Biopsy results reviewed, communicated, tracked, and documented

## 2022-04-13 NOTE — HPI: FULL BODY SKIN EXAMINATION
How Severe Are Your Spot(S)?: moderate
What Type Of Note Output Would You Prefer (Optional)?: Standard Output
What Is The Reason For Today's Visit?: Full Body Skin Examination
What Is The Reason For Today's Visit? (Being Monitored For X): concerning skin lesions on an annual basis
Additional History: Pt presents today for TBE and skin cancer surveillance due to hx of chronic sun exposure and family hx of skin cancer.

## 2023-05-26 ENCOUNTER — APPOINTMENT (OUTPATIENT)
Dept: URBAN - METROPOLITAN AREA CLINIC 279 | Age: 33
Setting detail: DERMATOLOGY
End: 2023-05-26

## 2023-05-26 DIAGNOSIS — D49.2 NEOPLASM OF UNSPECIFIED BEHAVIOR OF BONE, SOFT TISSUE, AND SKIN: ICD-10-CM

## 2023-05-26 PROCEDURE — OTHER MIPS QUALITY: OTHER

## 2023-05-26 PROCEDURE — OTHER ADDITIONAL NOTES: OTHER

## 2023-05-26 PROCEDURE — OTHER DEFER: OTHER

## 2023-05-26 NOTE — PROCEDURE: DEFER
Detail Level: Detailed
X Size Of Lesion In Cm (Optional): 2.5
Size Of Lesion In Cm (Optional): 4.2
Introduction Text (Please End With A Colon): The following procedure was deferred:

## 2023-05-26 NOTE — PROCEDURE: ADDITIONAL NOTES
Additional Notes: Patient agrees to follow up with Dr Rendon for excision with submission to pathology for diagnosis
Detail Level: Simple
Render Risk Assessment In Note?: no